# Patient Record
Sex: FEMALE | Race: BLACK OR AFRICAN AMERICAN | Employment: PART TIME | URBAN - METROPOLITAN AREA
[De-identification: names, ages, dates, MRNs, and addresses within clinical notes are randomized per-mention and may not be internally consistent; named-entity substitution may affect disease eponyms.]

---

## 2017-02-02 ENCOUNTER — HOSPITAL ENCOUNTER (OUTPATIENT)
Facility: HOSPITAL | Age: 47
Setting detail: OBSERVATION
Discharge: HOME | End: 2017-02-04
Attending: EMERGENCY MEDICINE | Admitting: INTERNAL MEDICINE
Payer: MEDICAID

## 2017-02-02 DIAGNOSIS — R11.2 SEVERE NAUSEA AND VOMITING: Primary | ICD-10-CM

## 2017-02-02 DIAGNOSIS — E87.1 HYPONATREMIA: ICD-10-CM

## 2017-02-02 LAB
ALBUMIN SERPL BCP-MCNC: 3.8 G/DL (ref 3.5–4.8)
ALBUMIN SERPL BCP-MCNC: 3.9 G/DL (ref 3.5–4.8)
ALBUMIN/GLOB SERPL: 1.1 {RATIO} (ref 1–2)
ALP SERPL-CCNC: 85 U/L (ref 32–100)
ALT SERPL-CCNC: 12 U/L (ref 14–54)
ANION GAP SERPL CALC-SCNC: 10 MMOL/L (ref 0–18)
ANION GAP SERPL CALC-SCNC: 10 MMOL/L (ref 0–18)
ANION GAP SERPL CALC-SCNC: 8 MMOL/L (ref 0–18)
ANION GAP SERPL CALC-SCNC: 8 MMOL/L (ref 0–18)
AST SERPL-CCNC: 20 U/L (ref 15–41)
BASOPHILS # BLD: 0.1 K/UL (ref 0–0.2)
BASOPHILS # BLD: 0.1 K/UL (ref 0–0.2)
BASOPHILS NFR BLD: 1 %
BASOPHILS NFR BLD: 1 %
BILIRUB SERPL-MCNC: 0.5 MG/DL (ref 0.3–1.2)
BILIRUB UR QL: NEGATIVE
BUN SERPL-MCNC: 3 MG/DL (ref 8–20)
BUN SERPL-MCNC: 4 MG/DL (ref 8–20)
BUN SERPL-MCNC: 4 MG/DL (ref 8–20)
BUN SERPL-MCNC: 5 MG/DL (ref 8–20)
BUN/CREAT SERPL: 3.8 (ref 10–20)
BUN/CREAT SERPL: 5.4 (ref 10–20)
BUN/CREAT SERPL: 5.5 (ref 10–20)
BUN/CREAT SERPL: 9.3 (ref 10–20)
CALCIUM SERPL-MCNC: 8.5 MG/DL (ref 8.5–10.5)
CALCIUM SERPL-MCNC: 8.9 MG/DL (ref 8.5–10.5)
CALCIUM SERPL-MCNC: 9 MG/DL (ref 8.5–10.5)
CALCIUM SERPL-MCNC: 9.4 MG/DL (ref 8.5–10.5)
CHLORIDE SERPL-SCNC: 81 MMOL/L (ref 95–110)
CHLORIDE SERPL-SCNC: 87 MMOL/L (ref 95–110)
CHLORIDE SERPL-SCNC: 89 MMOL/L (ref 95–110)
CHLORIDE SERPL-SCNC: 91 MMOL/L (ref 95–110)
CLARITY UR: CLEAR
CO2 SERPL-SCNC: 21 MMOL/L (ref 22–32)
CO2 SERPL-SCNC: 21 MMOL/L (ref 22–32)
CO2 SERPL-SCNC: 25 MMOL/L (ref 22–32)
CO2 SERPL-SCNC: 29 MMOL/L (ref 22–32)
COLOR UR: YELLOW
CREAT SERPL-MCNC: 0.54 MG/DL (ref 0.5–1.5)
CREAT SERPL-MCNC: 0.73 MG/DL (ref 0.5–1.5)
CREAT SERPL-MCNC: 0.74 MG/DL (ref 0.5–1.5)
CREAT SERPL-MCNC: 0.78 MG/DL (ref 0.5–1.5)
CREAT UR-MCNC: 51.5 MG/DL
EOSINOPHIL # BLD: 0 K/UL (ref 0–0.7)
EOSINOPHIL # BLD: 0.1 K/UL (ref 0–0.7)
EOSINOPHIL NFR BLD: 1 %
EOSINOPHIL NFR BLD: 1 %
ERYTHROCYTE [DISTWIDTH] IN BLOOD BY AUTOMATED COUNT: 13 % (ref 11–15)
ERYTHROCYTE [DISTWIDTH] IN BLOOD BY AUTOMATED COUNT: 13.1 % (ref 11–15)
GLOBULIN PLAS-MCNC: 3.5 G/DL (ref 2.5–3.7)
GLUCOSE SERPL-MCNC: 81 MG/DL (ref 70–99)
GLUCOSE SERPL-MCNC: 90 MG/DL (ref 70–99)
GLUCOSE SERPL-MCNC: 92 MG/DL (ref 70–99)
GLUCOSE SERPL-MCNC: 94 MG/DL (ref 70–99)
GLUCOSE UR-MCNC: NEGATIVE MG/DL
HCT VFR BLD AUTO: 32.4 % (ref 35–48)
HCT VFR BLD AUTO: 34.7 % (ref 35–48)
HGB BLD-MCNC: 11.3 G/DL (ref 12–16)
HGB BLD-MCNC: 12 G/DL (ref 12–16)
HGB UR QL STRIP.AUTO: NEGATIVE
KETONES UR-MCNC: NEGATIVE MG/DL
LEUKOCYTE ESTERASE UR QL STRIP.AUTO: NEGATIVE
LYMPHOCYTES # BLD: 2.2 K/UL (ref 1–4)
LYMPHOCYTES # BLD: 2.9 K/UL (ref 1–4)
LYMPHOCYTES NFR BLD: 51 %
LYMPHOCYTES NFR BLD: 52 %
MAGNESIUM SERPL-MCNC: 1.9 MG/DL (ref 1.8–2.5)
MCH RBC QN AUTO: 28.3 PG (ref 27–32)
MCH RBC QN AUTO: 28.5 PG (ref 27–32)
MCHC RBC AUTO-ENTMCNC: 34.7 G/DL (ref 32–37)
MCHC RBC AUTO-ENTMCNC: 34.9 G/DL (ref 32–37)
MCV RBC AUTO: 81.5 FL (ref 80–100)
MCV RBC AUTO: 81.5 FL (ref 80–100)
MONOCYTES # BLD: 0.3 K/UL (ref 0–1)
MONOCYTES # BLD: 0.5 K/UL (ref 0–1)
MONOCYTES NFR BLD: 7 %
MONOCYTES NFR BLD: 9 %
NEUTROPHILS # BLD AUTO: 1.7 K/UL (ref 1.8–7.7)
NEUTROPHILS # BLD AUTO: 2.2 K/UL (ref 1.8–7.7)
NEUTROPHILS NFR BLD: 38 %
NEUTROPHILS NFR BLD: 39 %
NITRITE UR QL STRIP.AUTO: NEGATIVE
OSMOLALITY UR CALC.SUM OF ELEC: 243 MOSM/KG (ref 275–295)
OSMOLALITY UR CALC.SUM OF ELEC: 246 MOSM/KG (ref 275–295)
OSMOLALITY UR CALC.SUM OF ELEC: 246 MOSM/KG (ref 275–295)
OSMOLALITY UR CALC.SUM OF ELEC: 250 MOSM/KG (ref 275–295)
OSMOLALITY UR: 272 MOSM/KG (ref 300–1100)
PH UR: 8 [PH] (ref 5–8)
PHOSPHATE SERPL-MCNC: 3.2 MG/DL (ref 2.4–4.7)
PLATELET # BLD AUTO: 319 K/UL (ref 140–400)
PLATELET # BLD AUTO: 340 K/UL (ref 140–400)
PMV BLD AUTO: 7.8 FL (ref 7.4–10.3)
PMV BLD AUTO: 7.9 FL (ref 7.4–10.3)
POTASSIUM SERPL-SCNC: 4.4 MMOL/L (ref 3.3–5.1)
POTASSIUM SERPL-SCNC: 4.7 MMOL/L (ref 3.3–5.1)
POTASSIUM SERPL-SCNC: 4.9 MMOL/L (ref 3.3–5.1)
POTASSIUM SERPL-SCNC: 4.9 MMOL/L (ref 3.3–5.1)
PROT SERPL-MCNC: 7.3 G/DL (ref 5.9–8.4)
PROT UR-MCNC: NEGATIVE MG/DL
RBC # BLD AUTO: 3.98 M/UL (ref 3.7–5.4)
RBC # BLD AUTO: 4.26 M/UL (ref 3.7–5.4)
RBC #/AREA URNS AUTO: <1 /HPF
SODIUM SERPL-SCNC: 118 MMOL/L (ref 136–144)
SODIUM SERPL-SCNC: 120 MMOL/L (ref 136–144)
SODIUM SERPL-SCNC: 120 MMOL/L (ref 136–144)
SODIUM SERPL-SCNC: 122 MMOL/L (ref 136–144)
SODIUM UR-SCNC: 84 MMOL/L
SP GR UR STRIP: 1.01 (ref 1–1.03)
TSH SERPL-ACNC: 0.46 UIU/ML (ref 0.34–5.6)
UROBILINOGEN UR STRIP-ACNC: <2
VIT C UR-MCNC: NEGATIVE MG/DL
WBC # BLD AUTO: 4.2 K/UL (ref 4–11)
WBC # BLD AUTO: 5.7 K/UL (ref 4–11)
WBC #/AREA URNS AUTO: 1 /HPF

## 2017-02-02 PROCEDURE — 99226 SUBSEQUENT OBSERVATION CARE: CPT | Performed by: INTERNAL MEDICINE

## 2017-02-02 RX ORDER — ONDANSETRON 2 MG/ML
4 INJECTION INTRAMUSCULAR; INTRAVENOUS ONCE
Status: COMPLETED | OUTPATIENT
Start: 2017-02-02 | End: 2017-02-02

## 2017-02-02 RX ORDER — SODIUM CHLORIDE 9 MG/ML
INJECTION, SOLUTION INTRAVENOUS CONTINUOUS
Status: ACTIVE | OUTPATIENT
Start: 2017-02-02 | End: 2017-02-02

## 2017-02-02 RX ORDER — SODIUM CHLORIDE 0.9 % (FLUSH) 0.9 %
SYRINGE (ML) INJECTION
Status: COMPLETED
Start: 2017-02-02 | End: 2017-02-02

## 2017-02-02 RX ORDER — OXCARBAZEPINE 300 MG/1
600 TABLET, FILM COATED ORAL 2 TIMES DAILY
Status: DISCONTINUED | OUTPATIENT
Start: 2017-02-02 | End: 2017-02-04

## 2017-02-02 RX ORDER — TOLVAPTAN 15 MG/1
7.5 TABLET ORAL DAILY
Status: DISCONTINUED | OUTPATIENT
Start: 2017-02-02 | End: 2017-02-02

## 2017-02-02 RX ORDER — SODIUM CHLORIDE 9 MG/ML
INJECTION, SOLUTION INTRAVENOUS CONTINUOUS
Status: DISCONTINUED | OUTPATIENT
Start: 2017-02-02 | End: 2017-02-04

## 2017-02-02 RX ORDER — LEVETIRACETAM 500 MG/1
500 TABLET ORAL 2 TIMES DAILY
Status: DISCONTINUED | OUTPATIENT
Start: 2017-02-02 | End: 2017-02-04

## 2017-02-02 RX ORDER — ALBUTEROL SULFATE 90 UG/1
1-2 AEROSOL, METERED RESPIRATORY (INHALATION) EVERY 6 HOURS PRN
Status: DISCONTINUED | OUTPATIENT
Start: 2017-02-02 | End: 2017-02-04

## 2017-02-02 RX ORDER — COSYNTROPIN 0.25 MG/ML
0.25 INJECTION, POWDER, FOR SOLUTION INTRAMUSCULAR; INTRAVENOUS ONCE
Status: COMPLETED | OUTPATIENT
Start: 2017-02-03 | End: 2017-02-03

## 2017-02-02 RX ORDER — LORAZEPAM 2 MG/ML
0.5 INJECTION INTRAMUSCULAR ONCE
Status: COMPLETED | OUTPATIENT
Start: 2017-02-02 | End: 2017-02-02

## 2017-02-02 RX ORDER — ONDANSETRON 2 MG/ML
4 INJECTION INTRAMUSCULAR; INTRAVENOUS EVERY 6 HOURS PRN
Status: DISCONTINUED | OUTPATIENT
Start: 2017-02-02 | End: 2017-02-04

## 2017-02-02 RX ORDER — LEVETIRACETAM 500 MG/1
500 TABLET ORAL 2 TIMES DAILY
COMMUNITY
End: 2021-01-21

## 2017-02-02 RX ORDER — SODIUM CHLORIDE 9 MG/ML
INJECTION, SOLUTION INTRAVENOUS
Status: DISPENSED
Start: 2017-02-02 | End: 2017-02-03

## 2017-02-02 RX ORDER — ALBUTEROL SULFATE 90 UG/1
1 AEROSOL, METERED RESPIRATORY (INHALATION) AS NEEDED
COMMUNITY
End: 2017-02-20

## 2017-02-02 RX ORDER — HYDROCORTISONE 10 MG/1
10 TABLET ORAL ONCE
Status: COMPLETED | OUTPATIENT
Start: 2017-02-02 | End: 2017-02-02

## 2017-02-02 RX ORDER — FOLIC ACID 1 MG/1
1 TABLET ORAL DAILY
COMMUNITY
End: 2021-01-21

## 2017-02-02 RX ORDER — LEVOTHYROXINE SODIUM 0.07 MG/1
100 TABLET ORAL
COMMUNITY
End: 2017-02-20

## 2017-02-02 RX ORDER — FOLIC ACID 1 MG/1
1 TABLET ORAL DAILY
Status: DISCONTINUED | OUTPATIENT
Start: 2017-02-02 | End: 2017-02-04

## 2017-02-02 RX ORDER — OXCARBAZEPINE 600 MG/1
600 TABLET, FILM COATED ORAL 2 TIMES DAILY
COMMUNITY
End: 2021-01-21

## 2017-02-02 NOTE — PLAN OF CARE
Patient/Family Goals    • Patient/Family Short Term Goal Progressing        No new complaints; ivf; up with assist;

## 2017-02-02 NOTE — ED NOTES
Spoke to arik to give reports and states she is unable to take report at this time since she is getting report on two other patients

## 2017-02-02 NOTE — ED NOTES
Assumed care of patient from [facility]. Patient is asleep at this time, breathing with ease, in no apparent distress. Patient given warm blanket for comfort. Will continue to monitor.

## 2017-02-02 NOTE — H&P
[facility]    History and Physical     De Santiago Patient Status:  Observation    1970 MRN [id number]   Location [facility]5W Attending Mason Velasquez MD   Hosp Day # 0 PCP None Pcp     Date:  2017  Date of Admission Negative. Cardiovascular: Negative. Gastrointestinal: Positive for vomiting and diarrhea. Endocrine: Negative. Genitourinary: Negative. Musculoskeletal: Negative. Hematological: Negative. Psychiatric/Behavioral: Negative.         Physica

## 2017-02-02 NOTE — ED PROVIDER NOTES
Patient Seen in: [facility] AND [facility] Emergency Department    History   Patient presents with:  Nausea/Vomiting/Diarrhea (gastrointestinal)    Stated Complaint: N/V     HPI    The patient is a 43-year-old female who presents to the emergency department with are normal. Mucous membranes are not dry. Oropharyngeal exudate (Post tonsillectomy exudate, no bleeding) present. Eyes: Conjunctivae and EOM are normal. Pupils are equal, round, and reactive to light. Neck: Normal range of motion. Neck supple.  No JVD DRAW GOLD   RAINBOW DRAW LAVENDER   RAINBOW DRAW LIGHT GREEN   RAINBOW DRAW DARK GREEN   RAINBOW DRAW LAVENDER TALL (BNP)       MDM     Pulse Ox: 100%, Normal, room air    Cardiac Monitor: Pulse Readings from Last 1 Encounters:  02/02/17 : 56  , sinus, nor

## 2017-02-03 LAB
ANION GAP SERPL CALC-SCNC: 3 MMOL/L (ref 0–18)
BUN SERPL-MCNC: 3 MG/DL (ref 8–20)
BUN/CREAT SERPL: 4.1 (ref 10–20)
CALCIUM SERPL-MCNC: 8.5 MG/DL (ref 8.5–10.5)
CHLORIDE SERPL-SCNC: 94 MMOL/L (ref 95–110)
CO2 SERPL-SCNC: 27 MMOL/L (ref 22–32)
CORTIS SERPL-MCNC: 12.9 MCG/DL
CORTIS SERPL-MCNC: 3.1 MCG/DL
CREAT SERPL-MCNC: 0.73 MG/DL (ref 0.5–1.5)
GLUCOSE SERPL-MCNC: 82 MG/DL (ref 70–99)
MRSA DNA SPEC QL NAA+PROBE: NEGATIVE
OSMOLALITY UR CALC.SUM OF ELEC: 254 MOSM/KG (ref 275–295)
POTASSIUM SERPL-SCNC: 4.4 MMOL/L (ref 3.3–5.1)
SODIUM SERPL-SCNC: 124 MMOL/L (ref 136–144)

## 2017-02-03 PROCEDURE — 99225 SUBSEQUENT OBSERVATION CARE: CPT | Performed by: INTERNAL MEDICINE

## 2017-02-03 RX ORDER — 0.9 % SODIUM CHLORIDE 0.9 %
VIAL (ML) INJECTION
Status: DISPENSED
Start: 2017-02-03 | End: 2017-02-03

## 2017-02-03 RX ORDER — HYDROCORTISONE 10 MG/1
10 TABLET ORAL DAILY
Status: DISCONTINUED | OUTPATIENT
Start: 2017-02-03 | End: 2017-02-03

## 2017-02-03 RX ORDER — HYDROCORTISONE 10 MG/1
20 TABLET ORAL DAILY
Status: DISCONTINUED | OUTPATIENT
Start: 2017-02-04 | End: 2017-02-04

## 2017-02-03 RX ORDER — FLUDROCORTISONE ACETATE 0.1 MG/1
0.1 TABLET ORAL DAILY
Status: DISCONTINUED | OUTPATIENT
Start: 2017-02-03 | End: 2017-02-03

## 2017-02-03 RX ORDER — HYDROCORTISONE 10 MG/1
10 TABLET ORAL NIGHTLY
Status: DISCONTINUED | OUTPATIENT
Start: 2017-02-03 | End: 2017-02-04

## 2017-02-03 NOTE — PROGRESS NOTES
[facility]    Progress Note    Inés De Santiago Patient Status:  Observation    1970 MRN [id number]   Location [facility] Attending Dione Rios MD   Hosp Day # 1 PCP None Pcp       Subjective:   Inés Arango is a(n) 40 y good strength  no deformities  Extremities: no edema, cyanosis  Neurological:  Grossly normal    Results:     Laboratory Data:    Lab Results  Component Value Date   WBC 4.2 02/02/2017   HGB 11.3* 02/02/2017   HCT 32.4* 02/02/2017    02/02/2017   CR

## 2017-02-03 NOTE — PROGRESS NOTES
[facility]    Progress Note     Anyi Patient Status:  Observation    1970 MRN [id number]   Location [facility] Attending Gregorio Sorto MD   Hosp Day # 1 PCP None Pcp     Subjective:     Constitutional: Thang Zarate

## 2017-02-03 NOTE — PAYOR COMM NOTE
Leydi Right #[id number]  (45 year old F)       [facility] [id number]         Yovany President, MD Physician Signed  H&P 2/2/2017  5:36 PM      Expand All Collapse All    [facility]    History and Physical      April AMAN De Santiago  Patient Status:  600 MG Oral Tab  Take 600 mg by mouth 2 (two) times daily.    Albuterol Sulfate  (90 Base) MCG/ACT Inhalation Aero Soln  Inhale 1 puff into the lungs as needed for Wheezing. Dose unknown by patient             Review of Systems:      Eyes: Negative.

## 2017-02-03 NOTE — CONSULTS
[facility]    PATIENT'S NAME: Shivani Cosme   ATTENDING PHYSICIAN: Asha Juares MD   CONSULTING PHYSICIAN: Anna Bee.  Crystal Oseguera MD   PATIENT ACCOUNT#:   [id number]    LOCATION:  [address] [place] [address] RECORD #:   [id number]       DATE OF BIRTH:  [birth year] equal and reactive. NECK:  Supple. No adenopathy or JVD. No bruits noted or thyromegaly. Sclerae anicteric. LUNGS:  Clear. HEART:  Regular rate and rhythm. No S3 or S4. No murmur. ABDOMEN:  Soft, nontender. Good bowel sounds. No bruits.   EXTREMI correct her and help her sodium levels. Her seizures are quiet right now. I discussed the case with Dr. Roge Bowen, and will follow up on Friday. Thank you very much for this consultation. Dictated By Tod Courtney.  Prabhu Mckeon MD  d: 02/03/2017 04:30:05  t: 86

## 2017-02-03 NOTE — PROGRESS NOTES
Nephrology consult dictated,, will follow w you. Bianca Murguia Spoke w dr Lc Lopez. . Thank you very much

## 2017-02-04 VITALS
SYSTOLIC BLOOD PRESSURE: 97 MMHG | HEART RATE: 72 BPM | RESPIRATION RATE: 18 BRPM | WEIGHT: 149.31 LBS | HEIGHT: 68 IN | DIASTOLIC BLOOD PRESSURE: 52 MMHG | BODY MASS INDEX: 22.63 KG/M2 | TEMPERATURE: 99 F | OXYGEN SATURATION: 100 %

## 2017-02-04 LAB
ANION GAP SERPL CALC-SCNC: 6 MMOL/L (ref 0–18)
BUN SERPL-MCNC: 4 MG/DL (ref 8–20)
BUN/CREAT SERPL: 5.9 (ref 10–20)
CALCIUM SERPL-MCNC: 8.6 MG/DL (ref 8.5–10.5)
CHLORIDE SERPL-SCNC: 104 MMOL/L (ref 95–110)
CO2 SERPL-SCNC: 26 MMOL/L (ref 22–32)
CREAT SERPL-MCNC: 0.68 MG/DL (ref 0.5–1.5)
GLUCOSE SERPL-MCNC: 90 MG/DL (ref 70–99)
OSMOLALITY UR CALC.SUM OF ELEC: 278 MOSM/KG (ref 275–295)
POTASSIUM SERPL-SCNC: 4.1 MMOL/L (ref 3.3–5.1)
SODIUM SERPL-SCNC: 136 MMOL/L (ref 136–144)
T4 SERPL-MCNC: 5.6 MCG/DL (ref 6.1–12.2)

## 2017-02-04 RX ORDER — HYDROCORTISONE 10 MG/1
10 TABLET ORAL NIGHTLY
Qty: 30 TABLET | Refills: 0 | Status: SHIPPED | OUTPATIENT
Start: 2017-02-04 | End: 2017-02-20

## 2017-02-04 RX ORDER — DEXTROSE MONOHYDRATE 50 MG/ML
INJECTION, SOLUTION INTRAVENOUS CONTINUOUS
Status: DISPENSED | OUTPATIENT
Start: 2017-02-04 | End: 2017-02-04

## 2017-02-04 RX ORDER — HYDROCORTISONE 20 MG/1
20 TABLET ORAL DAILY
Qty: 30 TABLET | Refills: 0 | Status: SHIPPED | OUTPATIENT
Start: 2017-02-04 | End: 2017-02-20

## 2017-02-04 NOTE — PLAN OF CARE
Patient/Family Goals    • Patient/Family Long Term Goal Progressing    • Patient/Family Short Term Goal Progressing        Tolerating po intake, denies N/V/D, po solu-cortef as ordered by MD, seizure precautions maintained, denies pain, steady when up with

## 2017-02-04 NOTE — DISCHARGE SUMMARY
[facility]    Discharge Summary     Anyi Patient Status:  Observation    1970 MRN [id number]   Location [facility]5W Attending Rohit Hills MD   Hosp Day # 2 PCP None Pcp     Date of Admission: 2017 Aron this was given:  20 mg on 2/4/2017  9:35 AM   Commonly known as:  CORTEF        Take 1 tablet (20 mg total) by mouth daily.     Quantity:  30 tablet   Refills:  0         CONTINUE taking these medications       Instructions Prescription details    Albuterol

## 2017-02-04 NOTE — PLAN OF CARE
Patient/Family Goals    • Patient/Family Long Term Goal Progressing    • Patient/Family Short Term Goal Progressing        Na level 124,continuing ivf,fluid restriction,denies any nausea/vomiting

## 2017-02-08 ENCOUNTER — TELEPHONE (OUTPATIENT)
Dept: NEPHROLOGY | Facility: CLINIC | Age: 47
End: 2017-02-08

## 2017-02-20 ENCOUNTER — APPOINTMENT (OUTPATIENT)
Dept: LAB | Facility: HOSPITAL | Age: 47
End: 2017-02-20
Attending: INTERNAL MEDICINE

## 2017-02-20 ENCOUNTER — OFFICE VISIT (OUTPATIENT)
Dept: NEPHROLOGY | Facility: CLINIC | Age: 47
End: 2017-02-20

## 2017-02-20 VITALS
HEIGHT: 68.5 IN | DIASTOLIC BLOOD PRESSURE: 75 MMHG | SYSTOLIC BLOOD PRESSURE: 125 MMHG | BODY MASS INDEX: 22.2 KG/M2 | HEART RATE: 66 BPM | WEIGHT: 148.19 LBS

## 2017-02-20 DIAGNOSIS — E27.40 ADRENAL CORTICAL INSUFFICIENCY (HCC): ICD-10-CM

## 2017-02-20 DIAGNOSIS — E87.1 HYPONATREMIA: Primary | ICD-10-CM

## 2017-02-20 LAB
ANION GAP SERPL CALC-SCNC: 8 MMOL/L (ref 0–18)
BUN SERPL-MCNC: 10 MG/DL (ref 8–20)
BUN/CREAT SERPL: 12.3 (ref 10–20)
CALCIUM SERPL-MCNC: 8.8 MG/DL (ref 8.5–10.5)
CHLORIDE SERPL-SCNC: 102 MMOL/L (ref 95–110)
CO2 SERPL-SCNC: 30 MMOL/L (ref 22–32)
CREAT SERPL-MCNC: 0.81 MG/DL (ref 0.5–1.5)
GLUCOSE SERPL-MCNC: 82 MG/DL (ref 70–99)
OSMOLALITY UR CALC.SUM OF ELEC: 288 MOSM/KG (ref 275–295)
POTASSIUM SERPL-SCNC: 3.1 MMOL/L (ref 3.3–5.1)
SODIUM SERPL-SCNC: 140 MMOL/L (ref 136–144)

## 2017-02-20 PROCEDURE — 99214 OFFICE O/P EST MOD 30 MIN: CPT | Performed by: INTERNAL MEDICINE

## 2017-02-20 PROCEDURE — 99212 OFFICE O/P EST SF 10 MIN: CPT | Performed by: INTERNAL MEDICINE

## 2017-02-20 PROCEDURE — 80048 BASIC METABOLIC PNL TOTAL CA: CPT | Performed by: INTERNAL MEDICINE

## 2017-02-20 RX ORDER — HYDROCODONE BITARTRATE AND ACETAMINOPHEN 5; 325 MG/1; MG/1
TABLET ORAL AS NEEDED
Refills: 0 | COMMUNITY
Start: 2017-01-23 | End: 2021-01-21

## 2017-02-20 RX ORDER — HYDROCORTISONE 20 MG/1
20 TABLET ORAL DAILY
Qty: 30 TABLET | Refills: 5 | Status: SHIPPED | OUTPATIENT
Start: 2017-02-20 | End: 2021-01-21

## 2017-02-20 RX ORDER — HYDROCORTISONE 10 MG/1
10 TABLET ORAL NIGHTLY
Qty: 30 TABLET | Refills: 3 | Status: SHIPPED | OUTPATIENT
Start: 2017-02-20 | End: 2017-06-16

## 2017-02-20 RX ORDER — LEVOTHYROXINE SODIUM 0.1 MG/1
100 TABLET ORAL
Qty: 90 TABLET | Refills: 1 | Status: SHIPPED | OUTPATIENT
Start: 2017-02-20 | End: 2021-01-21

## 2017-02-20 RX ORDER — ALBUTEROL SULFATE 90 UG/1
2 AEROSOL, METERED RESPIRATORY (INHALATION) EVERY 6 HOURS PRN
Qty: 1 INHALER | Refills: 3 | Status: SHIPPED | OUTPATIENT
Start: 2017-02-20

## 2017-02-20 NOTE — PATIENT INSTRUCTIONS
1..  INCREASE THYROID MED TO 100MCG DAY    2..   CORTISONE  20MG  AM    10MG  PM    3.  DO BLOOD TEST FOR SODIUM AND CALL ME FOR RESULTS    4. USE CREAM TO DRY SKIN  1-2 X A DAY    5.  FOLLOW UP W NEUROLOGIST    6..   I WILL SEND LETTER TO YOUR PCP    7.

## 2017-02-22 ENCOUNTER — TELEPHONE (OUTPATIENT)
Dept: NEPHROLOGY | Facility: CLINIC | Age: 47
End: 2017-02-22

## 2017-02-23 NOTE — TELEPHONE ENCOUNTER
Spoke w pt. . Will have 12 oz oj a day for k, ,doesnt' want supplemetns.  Results sent to pcp   Na issue ersolved w ej

## 2017-03-08 NOTE — PROGRESS NOTES
NEPHROLOGY PROGRESS NOTE  Papo Engel     MRN:  [id number]   Date of Service:  2/20/17     I saw April today for a followup for her kidneys. She had hyponatremia in [facility] last month with sodium levels down to 120.  She had nausea and vomiting duri

## 2017-06-16 RX ORDER — HYDROCORTISONE 10 MG/1
TABLET ORAL
Qty: 30 TABLET | Refills: 5 | Status: SHIPPED | OUTPATIENT
Start: 2017-06-16 | End: 2021-01-21

## 2017-09-28 RX ORDER — HYDROCORTISONE 20 MG/1
TABLET ORAL
Qty: 90 TABLET | Refills: 3 | OUTPATIENT
Start: 2017-09-28

## 2018-03-05 ENCOUNTER — HOSPITAL ENCOUNTER (EMERGENCY)
Facility: HOSPITAL | Age: 48
Discharge: HOME | End: 2018-03-05
Attending: EMERGENCY MEDICINE
Payer: COMMERCIAL

## 2018-03-05 ENCOUNTER — APPOINTMENT (OUTPATIENT)
Dept: GENERAL RADIOLOGY | Facility: HOSPITAL | Age: 48
End: 2018-03-05
Attending: EMERGENCY MEDICINE
Payer: COMMERCIAL

## 2018-03-05 VITALS
BODY MASS INDEX: 22 KG/M2 | TEMPERATURE: 98 F | HEART RATE: 64 BPM | OXYGEN SATURATION: 100 % | RESPIRATION RATE: 16 BRPM | DIASTOLIC BLOOD PRESSURE: 63 MMHG | SYSTOLIC BLOOD PRESSURE: 104 MMHG | WEIGHT: 148.13 LBS

## 2018-03-05 DIAGNOSIS — S16.1XXA STRAIN OF NECK MUSCLE, INITIAL ENCOUNTER: Primary | ICD-10-CM

## 2018-03-05 DIAGNOSIS — S40.011A CONTUSION OF RIGHT SHOULDER, INITIAL ENCOUNTER: ICD-10-CM
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
## 2018-03-05 PROCEDURE — 99284 EMERGENCY DEPT VISIT MOD MDM: CPT

## 2018-03-05 PROCEDURE — 73030 X-RAY EXAM OF SHOULDER: CPT | Performed by: EMERGENCY MEDICINE

## 2018-03-05 PROCEDURE — 96372 THER/PROPH/DIAG INJ SC/IM: CPT

## 2018-03-05 PROCEDURE — 72040 X-RAY EXAM NECK SPINE 2-3 VW: CPT | Performed by: EMERGENCY MEDICINE

## 2018-03-05 RX ORDER — CYCLOBENZAPRINE HCL 5 MG
5 TABLET ORAL 3 TIMES DAILY PRN
Qty: 15 TABLET | Refills: 0 | Status: SHIPPED | OUTPATIENT
Start: 2018-03-05 | End: 2021-01-21

## 2018-03-05 RX ORDER — KETOROLAC TROMETHAMINE 30 MG/ML
60 INJECTION, SOLUTION INTRAMUSCULAR; INTRAVENOUS ONCE
Status: COMPLETED | OUTPATIENT
Start: 2018-03-05 | End: 2018-03-05
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
## 2021-01-21 ENCOUNTER — LAB ENCOUNTER (OUTPATIENT)
Dept: LAB | Age: 51
End: 2021-01-21
Attending: FAMILY MEDICINE
Payer: COMMERCIAL

## 2021-01-21 ENCOUNTER — OFFICE VISIT (OUTPATIENT)
Dept: FAMILY MEDICINE CLINIC | Facility: CLINIC | Age: 51
End: 2021-01-21
Payer: COMMERCIAL

## 2021-01-21 VITALS
HEART RATE: 78 BPM | DIASTOLIC BLOOD PRESSURE: 75 MMHG | HEIGHT: 67.5 IN | SYSTOLIC BLOOD PRESSURE: 109 MMHG | WEIGHT: 146 LBS | BODY MASS INDEX: 22.65 KG/M2

## 2021-01-21 DIAGNOSIS — Z00.00 ANNUAL PHYSICAL EXAM: ICD-10-CM

## 2021-01-21 DIAGNOSIS — E23.0 PITUITARY INSUFFICIENCY (HCC): ICD-10-CM

## 2021-01-21 DIAGNOSIS — J45.909 UNCOMPLICATED ASTHMA, UNSPECIFIED ASTHMA SEVERITY, UNSPECIFIED WHETHER PERSISTENT: ICD-10-CM

## 2021-01-21 DIAGNOSIS — G40.909 SEIZURE DISORDER (HCC): ICD-10-CM

## 2021-01-21 DIAGNOSIS — Z01.419 WOMEN'S ANNUAL ROUTINE GYNECOLOGICAL EXAMINATION: Primary | ICD-10-CM

## 2021-01-21 PROBLEM — E03.9 HYPOTHYROIDISM: Status: ACTIVE | Noted: 2018-01-22

## 2021-01-21 PROBLEM — Z86.018 HISTORY OF PITUITARY ADENOMA: Status: ACTIVE | Noted: 2018-01-22

## 2021-01-21 PROBLEM — E27.40 ADRENAL INSUFFICIENCY (HCC): Status: ACTIVE | Noted: 2018-01-22

## 2021-01-21 LAB
ALBUMIN SERPL-MCNC: 3.8 G/DL (ref 3.4–5)
ALBUMIN/GLOB SERPL: 0.8 {RATIO} (ref 1–2)
ALP LIVER SERPL-CCNC: 77 U/L
ALT SERPL-CCNC: 23 U/L
ANION GAP SERPL CALC-SCNC: 4 MMOL/L (ref 0–18)
AST SERPL-CCNC: 34 U/L (ref 15–37)
BASOPHILS # BLD AUTO: 0.03 X10(3) UL (ref 0–0.2)
BASOPHILS NFR BLD AUTO: 0.5 %
BILIRUB SERPL-MCNC: 0.3 MG/DL (ref 0.1–2)
BUN BLD-MCNC: 13 MG/DL (ref 7–18)
BUN/CREAT SERPL: 11.8 (ref 10–20)
CALCIUM BLD-MCNC: 8.7 MG/DL (ref 8.5–10.1)
CHLORIDE SERPL-SCNC: 106 MMOL/L (ref 98–112)
CHOLEST SMN-MCNC: 259 MG/DL (ref ?–200)
CO2 SERPL-SCNC: 28 MMOL/L (ref 21–32)
CREAT BLD-MCNC: 1.1 MG/DL
DEPRECATED RDW RBC AUTO: 46.2 FL (ref 35.1–46.3)
EOSINOPHIL # BLD AUTO: 0.13 X10(3) UL (ref 0–0.7)
EOSINOPHIL NFR BLD AUTO: 2.4 %
ERYTHROCYTE [DISTWIDTH] IN BLOOD BY AUTOMATED COUNT: 14.6 % (ref 11–15)
GLOBULIN PLAS-MCNC: 4.6 G/DL (ref 2.8–4.4)
GLUCOSE BLD-MCNC: 63 MG/DL (ref 70–99)
HCT VFR BLD AUTO: 32.4 %
HDLC SERPL-MCNC: 46 MG/DL (ref 40–59)
HGB BLD-MCNC: 10.5 G/DL
IMM GRANULOCYTES # BLD AUTO: 0.01 X10(3) UL (ref 0–1)
IMM GRANULOCYTES NFR BLD: 0.2 %
LDLC SERPL CALC-MCNC: 158 MG/DL (ref ?–100)
LYMPHOCYTES # BLD AUTO: 3.04 X10(3) UL (ref 1–4)
LYMPHOCYTES NFR BLD AUTO: 55.3 %
M PROTEIN MFR SERPL ELPH: 8.4 G/DL (ref 6.4–8.2)
MCH RBC QN AUTO: 27.9 PG (ref 26–34)
MCHC RBC AUTO-ENTMCNC: 32.4 G/DL (ref 31–37)
MCV RBC AUTO: 86.2 FL
MONOCYTES # BLD AUTO: 0.27 X10(3) UL (ref 0.1–1)
MONOCYTES NFR BLD AUTO: 4.9 %
NEUTROPHILS # BLD AUTO: 2.02 X10 (3) UL (ref 1.5–7.7)
NEUTROPHILS # BLD AUTO: 2.02 X10(3) UL (ref 1.5–7.7)
NEUTROPHILS NFR BLD AUTO: 36.7 %
NONHDLC SERPL-MCNC: 213 MG/DL (ref ?–130)
OSMOLALITY SERPL CALC.SUM OF ELEC: 284 MOSM/KG (ref 275–295)
PATIENT FASTING Y/N/NP: NO
PATIENT FASTING Y/N/NP: NO
PLATELET # BLD AUTO: 213 10(3)UL (ref 150–450)
POTASSIUM SERPL-SCNC: 4 MMOL/L (ref 3.5–5.1)
RBC # BLD AUTO: 3.76 X10(6)UL
SODIUM SERPL-SCNC: 138 MMOL/L (ref 136–145)
TRIGL SERPL-MCNC: 274 MG/DL (ref 30–149)
TSI SER-ACNC: 3.56 MIU/ML (ref 0.36–3.74)
VLDLC SERPL CALC-MCNC: 55 MG/DL (ref 0–30)
WBC # BLD AUTO: 5.5 X10(3) UL (ref 4–11)

## 2021-01-21 PROCEDURE — 3074F SYST BP LT 130 MM HG: CPT | Performed by: FAMILY MEDICINE

## 2021-01-21 PROCEDURE — 3078F DIAST BP <80 MM HG: CPT | Performed by: FAMILY MEDICINE

## 2021-01-21 PROCEDURE — 99396 PREV VISIT EST AGE 40-64: CPT | Performed by: FAMILY MEDICINE

## 2021-01-21 PROCEDURE — 36415 COLL VENOUS BLD VENIPUNCTURE: CPT

## 2021-01-21 PROCEDURE — 80061 LIPID PANEL: CPT

## 2021-01-21 PROCEDURE — 3008F BODY MASS INDEX DOCD: CPT | Performed by: FAMILY MEDICINE

## 2021-01-21 PROCEDURE — 85025 COMPLETE CBC W/AUTO DIFF WBC: CPT

## 2021-01-21 PROCEDURE — 84443 ASSAY THYROID STIM HORMONE: CPT

## 2021-01-21 PROCEDURE — 80053 COMPREHEN METABOLIC PANEL: CPT

## 2021-01-21 RX ORDER — LEVETIRACETAM 500 MG/1
500 TABLET ORAL DAILY
Qty: 90 TABLET | Refills: 1 | Status: SHIPPED | OUTPATIENT
Start: 2021-01-21

## 2021-01-21 RX ORDER — LEVOTHYROXINE SODIUM 0.1 MG/1
100 TABLET ORAL
Qty: 90 TABLET | Refills: 1 | Status: CANCELLED | OUTPATIENT
Start: 2021-01-21

## 2021-01-21 RX ORDER — OXCARBAZEPINE 600 MG/1
600 TABLET, FILM COATED ORAL 2 TIMES DAILY
Refills: 0 | Status: CANCELLED | OUTPATIENT
Start: 2021-01-21

## 2021-01-21 RX ORDER — OXCARBAZEPINE 300 MG/1
300 TABLET, FILM COATED ORAL NIGHTLY
Qty: 90 TABLET | Refills: 0 | Status: SHIPPED | OUTPATIENT
Start: 2021-01-21 | End: 2021-05-25

## 2021-01-21 RX ORDER — OXCARBAZEPINE 300 MG/1
300 TABLET, FILM COATED ORAL NIGHTLY
COMMUNITY
Start: 2020-11-20 | End: 2021-01-21

## 2021-01-21 RX ORDER — LEVOTHYROXINE SODIUM 0.1 MG/1
100 TABLET ORAL
Qty: 90 TABLET | Refills: 1 | Status: SHIPPED | OUTPATIENT
Start: 2021-01-21 | End: 2021-07-19

## 2021-01-21 RX ORDER — OXCARBAZEPINE 300 MG/1
300 TABLET, FILM COATED ORAL NIGHTLY
Refills: 0 | Status: CANCELLED | OUTPATIENT
Start: 2021-01-21

## 2021-01-21 RX ORDER — LEVETIRACETAM 500 MG/1
500 TABLET ORAL 2 TIMES DAILY
Refills: 0 | Status: CANCELLED | OUTPATIENT
Start: 2021-01-21

## 2021-01-21 RX ORDER — OXCARBAZEPINE 600 MG/1
600 TABLET, FILM COATED ORAL DAILY
Qty: 90 TABLET | Refills: 1 | Status: SHIPPED | OUTPATIENT
Start: 2021-01-21 | End: 2021-08-18

## 2021-01-21 RX ORDER — OXCARBAZEPINE 600 MG/1
TABLET, FILM COATED ORAL
COMMUNITY
Start: 2018-09-21 | End: 2021-01-21

## 2021-01-21 NOTE — PROGRESS NOTES
HPI:    Patient ID: April C Henrietta Herman is a 53year old female. Patient here for physical.  PMHx reviewed/   Did not have physicians for year no insurance  Seizures controlled/ did not see       Review of Systems  Constitutional: Negative.   Negative for activ swelling, tenderness or discharge. Abdominal: Soft. Bowel sounds are normal. She exhibits no distension and no mass. There is no abdominal tenderness. There is no rebound and no guarding.  Hernia confirmed negative in the right inguinal area and confirmed Sig: Take 1 tablet (600 mg total) by mouth daily.    • Levothyroxine Sodium 100 MCG Oral Tab 90 tablet 1     Sig: Take 1 tablet (100 mcg total) by mouth before breakfast.       Imaging & Referrals:  ENDOCRINOLOGY - INTERNAL  PULMONARY - INTERNAL  NEURO - IN

## 2021-01-26 ENCOUNTER — VIRTUAL PHONE E/M (OUTPATIENT)
Dept: FAMILY MEDICINE CLINIC | Facility: CLINIC | Age: 51
End: 2021-01-26
Payer: COMMERCIAL

## 2021-01-26 DIAGNOSIS — D64.9 ANEMIA, UNSPECIFIED TYPE: Primary | ICD-10-CM

## 2021-01-26 PROCEDURE — 99213 OFFICE O/P EST LOW 20 MIN: CPT | Performed by: FAMILY MEDICINE

## 2021-01-26 RX ORDER — FERROUS SULFATE 325(65) MG
325 TABLET ORAL 2 TIMES DAILY
Qty: 60 TABLET | Refills: 2 | Status: SHIPPED | OUTPATIENT
Start: 2021-01-26 | End: 2021-04-27

## 2021-01-27 LAB
HPV I/H RISK 1 DNA SPEC QL NAA+PROBE: POSITIVE
LAST PAP RESULT: NORMAL
PAP HISTORY (OTHER THAN LAST PAP): NORMAL

## 2021-01-28 LAB
HPV16 DNA CVX QL PROBE+SIG AMP: NEGATIVE
HPV18 DNA CVX QL PROBE+SIG AMP: NEGATIVE

## 2021-02-08 NOTE — PROGRESS NOTES
HPI:    Patient ID: Inés Stephenson is a 53year old female.     Telehealth outside of [address] Verbal Consent   I conducted a telehealth visit with Inés Navarro today, 02/07/21, which was completed using two-way, real-time interactive audio and vid Systems  Constitutional: Negative. Negative for activity change, appetite change, diaphoresis and fatigue. Respiratory: Negative. Negative for apnea, cough, chest tightness and shortness of breath. Cardiovascular: Negative.   Negative for chest gretchen total) by mouth 2 (two) times a day.        Imaging & Referrals:  None       EI#[id number]

## 2021-02-23 NOTE — TELEPHONE ENCOUNTER
LOV 2/20/17
Labs including blood cultures drawn, UA sent, 2L NSS given and NSS started at 75ml/hr. Antibiotics started. See RRT note.

## 2021-04-27 RX ORDER — LIDOCAINE HYDROCHLORIDE 20 MG/ML
SOLUTION ORAL; TOPICAL
Qty: 60 TABLET | Refills: 0 | Status: SHIPPED | OUTPATIENT
Start: 2021-04-27

## 2021-05-25 RX ORDER — OXCARBAZEPINE 300 MG/1
300 TABLET, FILM COATED ORAL NIGHTLY
Qty: 90 TABLET | Refills: 0 | Status: SHIPPED | OUTPATIENT
Start: 2021-05-25 | End: 2021-07-19

## 2021-07-19 RX ORDER — OXCARBAZEPINE 300 MG/1
300 TABLET, FILM COATED ORAL NIGHTLY
Qty: 90 TABLET | Refills: 0 | Status: SHIPPED | OUTPATIENT
Start: 2021-07-19

## 2021-07-19 RX ORDER — LEVOTHYROXINE SODIUM 0.1 MG/1
100 TABLET ORAL
Qty: 90 TABLET | Refills: 0 | Status: SHIPPED | OUTPATIENT
Start: 2021-07-19 | End: 2021-10-17

## 2021-08-10 ENCOUNTER — TELEPHONE (OUTPATIENT)
Dept: FAMILY MEDICINE CLINIC | Facility: CLINIC | Age: 51
End: 2021-08-10

## 2021-08-10 NOTE — TELEPHONE ENCOUNTER
•  OXCARBAZEPINE 300 MG Oral Tab, Take 1 tablet (300 mg total) by mouth nightly.  TAKE AT BEDTIME, Disp: 90 tablet, Rfl: 0

## 2021-08-10 NOTE — TELEPHONE ENCOUNTER
[phone] [facility], [phone]   Outpatient Medication Detail     Disp Refills Start End    OXCARBAZEPINE 300 MG Oral Tab 90 tablet 0 7/19/2021     Sig - Route:  Take 1 tablet (300 mg total) by mouth sandy

## 2021-08-18 RX ORDER — OXCARBAZEPINE 600 MG/1
600 TABLET, FILM COATED ORAL DAILY
Qty: 90 TABLET | Refills: 1 | Status: SHIPPED | OUTPATIENT
Start: 2021-08-18

## 2021-08-18 NOTE — TELEPHONE ENCOUNTER
Please Review.  Protocol failed / No protocol     Requested Prescriptions   Pending Prescriptions Disp Refills    OXCARBAZEPINE 600 MG Oral Tab [Pharmacy Med Name: Oxcarbazepine 600 Mg Tab Stephon] 90 tablet 0     Sig: Take 1 tablet (600 mg total) by mouth roberto

## 2021-10-17 RX ORDER — LEVOTHYROXINE SODIUM 0.1 MG/1
100 TABLET ORAL
Qty: 90 TABLET | Refills: 1 | Status: SHIPPED | OUTPATIENT
Start: 2021-10-17

## 2021-10-17 NOTE — TELEPHONE ENCOUNTER
Refill passed per [facility] protocol   Requested Prescriptions   Pending Prescriptions Disp Refills    LEVOTHYROXINE 100 MCG Oral Tab [Pharmacy Med Name: Levothyroxine Sodium 100 Mcg Tab Lupi] 90 tablet 0     Sig: Take 1 tablet (100 mcg total) by monty

## 2021-10-29 NOTE — TELEPHONE ENCOUNTER
Please review. Protocol failed / No protocol.   Requested Prescriptions   Pending Prescriptions Disp Refills    OXCARBAZEPINE 300 MG Oral Tab [Pharmacy Med Name: Oxcarbazepine 300 Mg Tab Stephon] 90 tablet 0     Sig: Take 1 tablet (300 mg total) by mouth sandy

## 2021-10-30 RX ORDER — OXCARBAZEPINE 600 MG/1
600 TABLET, FILM COATED ORAL DAILY
Qty: 90 TABLET | Refills: 0 | OUTPATIENT
Start: 2021-10-30

## 2021-10-30 RX ORDER — OXCARBAZEPINE 300 MG/1
300 TABLET, FILM COATED ORAL NIGHTLY
Qty: 90 TABLET | Refills: 0 | OUTPATIENT
Start: 2021-10-30

## 2021-10-30 NOTE — TELEPHONE ENCOUNTER
[employer] message sent. Left complete message to patient's voice mail=second attempt. Pended medications declined-see Dr Alvarez Friends note. Yrn Castle

## 2021-11-01 NOTE — TELEPHONE ENCOUNTER
No response letter sent in the mail. The patient is not calling us back or viewing her [employer] message.     Refusal reason: Refill not appropriate (per PCP, refil; should be from neurology ,)

## 2022-04-14 NOTE — TELEPHONE ENCOUNTER
Protocol failed or has No Protocol, please review    Last office visit = 1/26/2021   Last refill = 8/2021    Requested Prescriptions   Pending Prescriptions Disp Refills    OXCARBAZEPINE 600 MG Oral Tab [Pharmacy Med Name: Oxcarbazepine 600 Mg Tab Stephon] 90 tablet 0     Sig: Take 1 tablet (600 mg total) by mouth daily.         Neurology Medications Failed - 4/14/2022  1:30 AM        Failed - Appointment in the past 6 or next 3 months              Recent Outpatient Visits              1 year ago Anemia, unspecified type    Alondra Christian MD    [employer] E/M    1 year ago [employer] annual routine gynecological examination    Alondra Christian MD    Office Visit    5 years ago Hyponatremia    [facility], [address] [place] [address], [facility]    Office Visit

## 2022-04-15 RX ORDER — OXCARBAZEPINE 600 MG/1
600 TABLET, FILM COATED ORAL DAILY
Qty: 90 TABLET | Refills: 1 | Status: SHIPPED | OUTPATIENT
Start: 2022-04-15

## 2022-05-10 RX ORDER — OXCARBAZEPINE 600 MG/1
600 TABLET, FILM COATED ORAL DAILY
Qty: 90 TABLET | Refills: 0 | OUTPATIENT
Start: 2022-05-10

## 2022-06-16 ENCOUNTER — TELEPHONE (OUTPATIENT)
Dept: ENDOCRINOLOGY CLINIC | Facility: CLINIC | Age: 52
End: 2022-06-16

## 2022-06-16 NOTE — TELEPHONE ENCOUNTER
Received a fax from [facility] from Alphonse Pfeiffer MD of pt's [facility] dated on 6/14/22. Pt currently does not have a scheduled appointment with Dr. Celia Villalta called to help schedule a consult appointment for Acquired Hypothyroidism, Vit D deficiency, Vitamin B12 deficiency (non anemic). Called to help schedule a consult appointment, no answer, mailbox was full, unable to leave a message will try again later.

## 2022-06-20 NOTE — TELEPHONE ENCOUNTER
[facility] HOSPITALIST     Christa Padron PA    CHIEF COMPLAINT: Headache    Patient admitted for planned elective craniotomy due to her previously diagnosed meningioma.  Patient has been monitored in the ICU perioperatively has been stable and is currently recommended for transfer to a PCU bed.  Patient seen at bedside he is doing well has no complaints of of now.  No significant pain has been having bowel movements urinating without a problem.  Has no chest pain no nausea no vomiting no focal weakness no focal numbness.    Review of systems  Neuro no focal is no focal numbness no headache  GI no nausea no vomiting  Cardiac no chest pain no palpitations    Past Medical History:   Diagnosis Date   • Allergic    • Anxiety    • Asthma    • Cancer (HCC)     ALL as child    • Class 3 severe obesity with body mass index (BMI) of 40.0 to 44.9 in adult 11/2/2021   • Depression    • GERD (gastroesophageal reflux disease)    • Hypertension    • Leukemia in remission, childhood; S/P Radiation      Past Surgical History:   Procedure Laterality Date   • CRANIOTOMY FOR TUMOR Left 11/2/2021    Procedure: CRANIOTOMY FOR RESECTION OF LEFT PARIETAL TUMOR;  Surgeon: Francois Jean MD;  Location: [facility] OR;  Service: Neurosurgery;  Laterality: Left;   • HERNIA REPAIR      7 MONTHS OLD   • PORTACATH PLACEMENT      CHILDHOOD     Family History   Problem Relation Age of Onset   • Cancer Maternal Grandfather      Social History     Tobacco Use   • Smoking status: Never Smoker   • Smokeless tobacco: Never Used   Vaping Use   • Vaping Use: Never used   Substance Use Topics   • Alcohol use: Yes     Comment: Weekends   • Drug use: Never     Medications Prior to Admission   Medication Sig Dispense Refill Last Dose   • amLODIPine (NORVASC) 10 MG tablet Take 1 tablet by mouth Daily. (Patient taking differently: Take 5 mg by mouth Daily.) 30 tablet 0 11/2/2021 at Unknown time   • dexamethasone (DECADRON) 4 MG tablet  Called to help schedule a consult appointment, no answer, mailbox was full, unable to leave a message.  x2 "Take 4 mg by mouth 4 (Four) Times a Day.   11/2/2021 at Unknown time   • famotidine (PEPCID) 20 MG tablet Take 20 mg by mouth 2 (Two) Times a Day. Take DOS   11/2/2021 at Unknown time   • levETIRAcetam (KEPPRA) 500 MG tablet Take 500 mg by mouth 2 (two) times a day. Take DOS   11/2/2021 at Unknown time   • sucralfate (Carafate) 1 g tablet Take 1 tablet by mouth 4 (Four) Times a Day. 28 tablet 0 11/1/2021 at Unknown time     Allergies:  Patient has no known allergies.      There is no immunization history on file for this patient.        Vital Signs  Visit Vitals  BP (!) 159/101   Pulse 110   Temp 98 °F (36.7 °C) (Oral)   Resp 17   Ht 167.6 cm (65.98\")   Wt 130 kg (285 lb 15 oz)   SpO2 95%   BMI 46.17 kg/m²       Physical Exam  Physical Exam   Constitutional:  oriented to person, place, and time. No distress.   HENT:   Head: Craniotomy site within normal limits  Eyes: Conjunctivae and EOM are normal. Pupils are equal, round, and reactive to light.   Neck: No JVD present. No thyromegaly present.   Cardiovascular: Normal rate, regular rhythm, normal heart sounds and intact distal pulses. Exam reveals no gallop and no friction rub.   No murmur heard.  Pulmonary/Chest: Effort normal and breath sounds normal. No stridor. No respiratory distress.  has no wheezes.  has no rales.  exhibits no tenderness.   Abdominal: Soft. Bowel sounds are normal.  no distension and no mass. There is no tenderness. There is no rebound and no guarding. No hernia.   Musculoskeletal: Normal range of motion.   Lymphadenopathy:     no cervical adenopathy.   Neurological:  alert and oriented to person, place, and time. No cranial nerve deficit or sensory deficit. exhibits normal muscle tone.   Skin: No rash noted.  not diaphoretic.   Psychiatric:  normal mood and affect.   Vitals reviewed.         Results Review:    I reviewed the patient's new clinical results.  Lab Results (last 24 hours)     Procedure Component Value Units Date/Time    T4, Free " [[id number]] Collected: 11/04/21 1106    Specimen: Blood Updated: 11/04/21 1307    T3, Free [[id number]] Collected: 11/04/21 1106    Specimen: Blood Updated: 11/04/21 1307    POC Glucose Once [[id number]]  (Abnormal) Collected: 11/04/21 1242    Specimen: Blood Updated: 11/04/21 1243     Glucose 187 mg/dL      Comment: Serial Number: [id number]:  [id number]       Osmolality, Serum [[id number]]  (Normal) Collected: 11/04/21 1106    Specimen: Blood Updated: 11/04/21 1237     Osmolality 276 mOsm/kg     Cortisol [[id number]] Collected: 11/04/21 1106    Specimen: Blood Updated: 11/04/21 1225     Cortisol 1.38 mcg/dL     Narrative:      Cortisol Reference Ranges:    Cortisol 6AM - 10AM Range: 6.02-18.40 mcg/dl  Cortisol 4PM - 8PM Range: 2.68-10.50 mcg/dl      Results may be falsely increased if patient taking Biotin.      TSH [[id number]]  (Abnormal) Collected: 11/04/21 1106    Specimen: Blood Updated: 11/04/21 1214     TSH 0.125 uIU/mL     Uric Acid [[id number]]  (Abnormal) Collected: 11/04/21 1106    Specimen: Blood Updated: 11/04/21 1212     Uric Acid 2.6 mg/dL     POC Glucose Once [[id number]]  (Abnormal) Collected: 11/04/21 0748    Specimen: Blood Updated: 11/04/21 0749     Glucose 176 mg/dL      Comment: Serial Number: [id number]:  [id number]       Manual Differential [[id number]]  (Abnormal) Collected: 11/04/21 0510    Specimen: Blood Updated: 11/04/21 0657     Neutrophil % 90.0 %      Lymphocyte % 2.0 %      Monocyte % 3.0 %      Bands %  5.0 %      Neutrophils Absolute 11.02 10*3/mm3      Lymphocytes Absolute 0.23 10*3/mm3      Monocytes Absolute 0.35 10*3/mm3      Anisocytosis Slight/1+     WBC Morphology Normal     Platelet Morphology Normal    CBC & Differential [[id number]]  (Abnormal) Collected: 11/04/21 0510    Specimen: Blood Updated: 11/04/21 0657    Narrative:      The following orders were created for panel order CBC & Differential.  Procedure                               Abnormality         Status                      ---------                               -----------         ------                     CBC Auto Differential[[id number]]        Abnormal            Final result               Scan Slide[[id number]]                                       Final result                 Please view results for these tests on the individual orders.    Scan Slide [[id number]] Collected: 11/04/21 0510    Specimen: Blood Updated: 11/04/21 0657     Scan Slide --     Comment: See Manual Differential Results       CBC Auto Differential [[id number]]  (Abnormal) Collected: 11/04/21 0510    Specimen: Blood Updated: 11/04/21 0657     WBC 11.60 10*3/mm3      RBC 3.77 10*6/mm3      Hemoglobin 10.8 g/dL      Hematocrit 31.9 %      MCV 84.5 fL      MCH 28.7 pg      MCHC 34.0 g/dL      RDW 16.2 %      RDW-SD 47.9 fl      MPV 6.9 fL      Platelets 148 10*3/mm3     Narrative:      The previously reported component NRBC is no longer being reported. Previous result was 0.0 /100 WBC (Reference Range: 0.0-0.2 /100 WBC) on 11/4/2021 at 0541 EDT.    Comprehensive Metabolic Panel [[id number]]  (Abnormal) Collected: 11/04/21 0510    Specimen: Blood Updated: 11/04/21 0542     Glucose 201 mg/dL      BUN 21 mg/dL      Creatinine 0.73 mg/dL      Sodium 128 mmol/L      Potassium 4.8 mmol/L      Chloride 94 mmol/L      CO2 20.0 mmol/L      Calcium 8.1 mg/dL      Total Protein 5.6 g/dL      Albumin 3.60 g/dL      ALT (SGPT) 62 U/L      AST (SGOT) 16 U/L      Alkaline Phosphatase 34 U/L      Total Bilirubin 0.5 mg/dL      eGFR Non African Amer 119 mL/min/1.73      Globulin 2.0 gm/dL      A/G Ratio 1.8 g/dL      BUN/Creatinine Ratio 28.8     Anion Gap 14.0 mmol/L     Narrative:      GFR Normal >60  Chronic Kidney Disease <60  Kidney Failure <15      Phosphorus [[id number]]  (Normal) Collected: 11/04/21 0510    Specimen: Blood Updated: 11/04/21 0542     Phosphorus 3.0 mg/dL     Magnesium [[id number]]  (Normal) Collected: 11/04/21 0510    Specimen: Blood Updated:  11/04/21 0541     Magnesium 2.3 mg/dL     POC Glucose Once [[id number]]  (Abnormal) Collected: 11/03/21 2000    Specimen: Blood Updated: 11/03/21 2001     Glucose 200 mg/dL      Comment: Serial Number: [id number]:  [id number]       POC Glucose Once [[id number]]  (Abnormal) Collected: 11/03/21 1648    Specimen: Blood Updated: 11/03/21 1649     Glucose 162 mg/dL      Comment: Serial Number: [id number]:  [id number]                 Assessment/Plan   Temporoparietal brain mass  Status post craniotomy tumor resection  Pathology pending suspected meningioma  Continue steroids  Continue seizure prophylaxis with Keppra    Hyponatremia  Start on salt tabs consider SIADH due to above  Check labs osmolalities serum and urine, urine sodium, TSH, cortisol, uric acid sodium and urine, creatinine urine  Restrict free water.  Appears euvolemic  Start on salt tabs per primary team    Hypertension  Continue amlodipine    Hyperglycemia  On systemic steroids  Continue sliding scale    GERD  Continue PPI and Carafate    DVT PUD prophylaxis    Plan as above  Jesika Hearn MD  11/04/21  13:16 EDT

## 2022-10-25 ENCOUNTER — HOSPITAL ENCOUNTER (OUTPATIENT)
Age: 52
Discharge: HOME | End: 2022-10-25
Payer: COMMERCIAL

## 2022-10-25 ENCOUNTER — APPOINTMENT (OUTPATIENT)
Dept: CT IMAGING | Age: 52
End: 2022-10-25
Attending: EMERGENCY MEDICINE
Payer: COMMERCIAL

## 2022-10-25 VITALS
SYSTOLIC BLOOD PRESSURE: 120 MMHG | DIASTOLIC BLOOD PRESSURE: 78 MMHG | TEMPERATURE: 98 F | OXYGEN SATURATION: 100 % | RESPIRATION RATE: 18 BRPM | HEART RATE: 95 BPM

## 2022-10-25 DIAGNOSIS — R05.1 ACUTE COUGH: ICD-10-CM

## 2022-10-25 DIAGNOSIS — R07.81 RIB PAIN ON LEFT SIDE: ICD-10-CM

## 2022-10-25 DIAGNOSIS — J06.9 UPPER RESPIRATORY TRACT INFECTION, UNSPECIFIED TYPE: Primary | ICD-10-CM

## 2022-10-25 LAB
#MXD IC: 0.4 X10ˆ3/UL (ref 0.1–1)
BUN BLD-MCNC: 16 MG/DL (ref 7–18)
CHLORIDE BLD-SCNC: 103 MMOL/L (ref 98–112)
CO2 BLD-SCNC: 26 MMOL/L (ref 21–32)
CREAT BLD-MCNC: 1 MG/DL
DDIMER WHOLE BLOOD: 926 NG/ML DDU (ref ?–400)
GFR SERPLBLD BASED ON 1.73 SQ M-ARVRAT: 68 ML/MIN/1.73M2 (ref 60–?)
GLUCOSE BLD-MCNC: 79 MG/DL (ref 70–99)
HCT VFR BLD AUTO: 33.3 %
HCT VFR BLD CALC: 35 %
HGB BLD-MCNC: 10.7 G/DL
ISTAT IONIZED CALCIUM FOR CHEM 8: 1.03 MMOL/L (ref 1.12–1.32)
LYMPHOCYTES # BLD AUTO: 2 X10ˆ3/UL (ref 1–4)
LYMPHOCYTES NFR BLD AUTO: 25.9 %
MCH RBC QN AUTO: 28 PG (ref 26–34)
MCHC RBC AUTO-ENTMCNC: 32.1 G/DL (ref 31–37)
MCV RBC AUTO: 87.2 FL (ref 80–100)
MIXED CELL %: 5.5 %
NEUTROPHILS # BLD AUTO: 5.3 X10ˆ3/UL (ref 1.5–7.7)
NEUTROPHILS NFR BLD AUTO: 68.6 %
PLATELET # BLD AUTO: 191 X10ˆ3/UL (ref 150–450)
POTASSIUM BLD-SCNC: 4 MMOL/L (ref 3.6–5.1)
RBC # BLD AUTO: 3.82 X10ˆ6/UL
SODIUM BLD-SCNC: 137 MMOL/L (ref 136–145)
TROPONIN I BLD-MCNC: <0.02 NG/ML
WBC # BLD AUTO: 7.7 X10ˆ3/UL (ref 4–11)

## 2022-10-25 PROCEDURE — 80047 BASIC METABLC PNL IONIZED CA: CPT | Performed by: EMERGENCY MEDICINE

## 2022-10-25 PROCEDURE — 71260 CT THORAX DX C+: CPT | Performed by: EMERGENCY MEDICINE

## 2022-10-25 PROCEDURE — 99204 OFFICE O/P NEW MOD 45 MIN: CPT | Performed by: EMERGENCY MEDICINE

## 2022-10-25 PROCEDURE — 93000 ELECTROCARDIOGRAM COMPLETE: CPT | Performed by: EMERGENCY MEDICINE

## 2022-10-25 PROCEDURE — 85025 COMPLETE CBC W/AUTO DIFF WBC: CPT | Performed by: EMERGENCY MEDICINE

## 2022-10-25 PROCEDURE — 84484 ASSAY OF TROPONIN QUANT: CPT | Performed by: EMERGENCY MEDICINE

## 2022-10-25 RX ORDER — ALBUTEROL SULFATE 2.5 MG/3ML
2.5 SOLUTION RESPIRATORY (INHALATION) EVERY 4 HOURS PRN
Qty: 30 EACH | Refills: 0 | Status: SHIPPED | OUTPATIENT
Start: 2022-10-25 | End: 2022-11-24

## 2022-10-25 RX ORDER — BENZONATATE 100 MG/1
100 CAPSULE ORAL 3 TIMES DAILY PRN
Qty: 30 CAPSULE | Refills: 0 | Status: SHIPPED | OUTPATIENT
Start: 2022-10-25 | End: 2022-11-24

## 2022-10-25 RX ORDER — METHOCARBAMOL 500 MG/1
500 TABLET, FILM COATED ORAL 3 TIMES DAILY PRN
Qty: 10 TABLET | Refills: 0 | Status: SHIPPED | OUTPATIENT
Start: 2022-10-25

## 2022-10-25 NOTE — ED INITIAL ASSESSMENT (HPI)
Pt came in due to cough, congestion, body aches, and fatigue. Pt stated she tested positive for covid about 2 months ago. Pt has easy non labored respirations.

## 2023-03-05 ENCOUNTER — APPOINTMENT (OUTPATIENT)
Dept: GENERAL RADIOLOGY | Facility: HOSPITAL | Age: 53
End: 2023-03-05
Attending: EMERGENCY MEDICINE
Payer: COMMERCIAL

## 2023-03-05 ENCOUNTER — HOSPITAL ENCOUNTER (EMERGENCY)
Facility: HOSPITAL | Age: 53
Discharge: HOME | End: 2023-03-05
Attending: EMERGENCY MEDICINE
Payer: COMMERCIAL

## 2023-03-05 VITALS
HEIGHT: 69 IN | SYSTOLIC BLOOD PRESSURE: 108 MMHG | RESPIRATION RATE: 16 BRPM | BODY MASS INDEX: 19.99 KG/M2 | DIASTOLIC BLOOD PRESSURE: 71 MMHG | TEMPERATURE: 98 F | HEART RATE: 78 BPM | OXYGEN SATURATION: 100 % | WEIGHT: 135 LBS

## 2023-03-05 DIAGNOSIS — M54.16 LUMBAR RADICULOPATHY: Primary | ICD-10-CM

## 2023-03-05 PROCEDURE — 72100 X-RAY EXAM L-S SPINE 2/3 VWS: CPT | Performed by: EMERGENCY MEDICINE

## 2023-03-05 PROCEDURE — 96372 THER/PROPH/DIAG INJ SC/IM: CPT | Performed by: EMERGENCY MEDICINE

## 2023-03-05 PROCEDURE — 99284 EMERGENCY DEPT VISIT MOD MDM: CPT | Performed by: EMERGENCY MEDICINE

## 2023-03-05 PROCEDURE — 99283 EMERGENCY DEPT VISIT LOW MDM: CPT | Performed by: EMERGENCY MEDICINE

## 2023-03-05 RX ORDER — METHYLPREDNISOLONE 4 MG/1
TABLET ORAL
Qty: 1 EACH | Refills: 0 | Status: SHIPPED | OUTPATIENT
Start: 2023-03-05

## 2023-03-05 RX ORDER — MORPHINE SULFATE 4 MG/ML
4 INJECTION, SOLUTION INTRAMUSCULAR; INTRAVENOUS ONCE
Status: COMPLETED | OUTPATIENT
Start: 2023-03-05 | End: 2023-03-05

## 2023-03-05 RX ORDER — HYDROCODONE BITARTRATE AND ACETAMINOPHEN 5; 325 MG/1; MG/1
1 TABLET ORAL EVERY 6 HOURS PRN
Qty: 10 TABLET | Refills: 0 | Status: SHIPPED | OUTPATIENT
Start: 2023-03-05 | End: 2023-03-12

## 2023-03-05 NOTE — ED INITIAL ASSESSMENT (HPI)
Aox4. Arrives in wheelchair. Complaints of acute on chronic lower/mid back pain. States injury 1 yr prior, none recent. Pain radiates down left leg. Taking gabapentin with mild relief. Denies incontinence.

## 2023-06-15 ENCOUNTER — LAB ENCOUNTER (OUTPATIENT)
Dept: LAB | Facility: HOSPITAL | Age: 53
End: 2023-06-15
Attending: ORTHOPAEDIC SURGERY
Payer: COMMERCIAL

## 2023-06-15 DIAGNOSIS — Z01.818 PREOP TESTING: ICD-10-CM

## 2023-06-15 DIAGNOSIS — R79.1 ABNORMAL COAGULATION PROFILE: ICD-10-CM

## 2023-06-15 DIAGNOSIS — Z01.818 ENCOUNTER FOR OTHER PREPROCEDURAL EXAMINATION: Primary | ICD-10-CM

## 2023-06-15 LAB
ALBUMIN SERPL-MCNC: 3.6 G/DL (ref 3.4–5)
ALBUMIN/GLOB SERPL: 1 {RATIO} (ref 1–2)
ALP LIVER SERPL-CCNC: 99 U/L
ALT SERPL-CCNC: 43 U/L
ANION GAP SERPL CALC-SCNC: 4 MMOL/L (ref 0–18)
ANTIBODY SCREEN: NEGATIVE
AST SERPL-CCNC: 20 U/L (ref 15–37)
ATRIAL RATE: 71 BPM
BASOPHILS # BLD AUTO: 0.01 X10(3) UL (ref 0–0.2)
BASOPHILS NFR BLD AUTO: 0.1 %
BILIRUB SERPL-MCNC: 0.2 MG/DL (ref 0.1–2)
BUN BLD-MCNC: 11 MG/DL (ref 7–18)
BUN/CREAT SERPL: 16.4 (ref 10–20)
CALCIUM BLD-MCNC: 8.6 MG/DL (ref 8.5–10.1)
CHLORIDE SERPL-SCNC: 105 MMOL/L (ref 98–112)
CO2 SERPL-SCNC: 31 MMOL/L (ref 21–32)
CREAT BLD-MCNC: 0.67 MG/DL
DEPRECATED RDW RBC AUTO: 47.8 FL (ref 35.1–46.3)
EOSINOPHIL # BLD AUTO: 0.03 X10(3) UL (ref 0–0.7)
EOSINOPHIL NFR BLD AUTO: 0.4 %
ERYTHROCYTE [DISTWIDTH] IN BLOOD BY AUTOMATED COUNT: 14.6 % (ref 11–15)
FASTING STATUS PATIENT QL REPORTED: NO
GFR SERPLBLD BASED ON 1.73 SQ M-ARVRAT: 105 ML/MIN/1.73M2 (ref 60–?)
GLOBULIN PLAS-MCNC: 3.7 G/DL (ref 2.8–4.4)
GLUCOSE BLD-MCNC: 68 MG/DL (ref 70–99)
HCT VFR BLD AUTO: 35.3 %
HGB BLD-MCNC: 11.4 G/DL
IMM GRANULOCYTES # BLD AUTO: 0.04 X10(3) UL (ref 0–1)
IMM GRANULOCYTES NFR BLD: 0.6 %
LYMPHOCYTES # BLD AUTO: 2.09 X10(3) UL (ref 1–4)
LYMPHOCYTES NFR BLD AUTO: 28.9 %
MCH RBC QN AUTO: 29.2 PG (ref 26–34)
MCHC RBC AUTO-ENTMCNC: 32.3 G/DL (ref 31–37)
MCV RBC AUTO: 90.5 FL
MONOCYTES # BLD AUTO: 0.43 X10(3) UL (ref 0.1–1)
MONOCYTES NFR BLD AUTO: 5.9 %
NEUTROPHILS # BLD AUTO: 4.63 X10 (3) UL (ref 1.5–7.7)
NEUTROPHILS # BLD AUTO: 4.63 X10(3) UL (ref 1.5–7.7)
NEUTROPHILS NFR BLD AUTO: 64.1 %
OSMOLALITY SERPL CALC.SUM OF ELEC: 288 MOSM/KG (ref 275–295)
P AXIS: 72 DEGREES
P-R INTERVAL: 162 MS
PLATELET # BLD AUTO: 183 10(3)UL (ref 150–450)
POTASSIUM SERPL-SCNC: 4 MMOL/L (ref 3.5–5.1)
PROT SERPL-MCNC: 7.3 G/DL (ref 6.4–8.2)
Q-T INTERVAL: 408 MS
QRS DURATION: 84 MS
QTC CALCULATION (BEZET): 443 MS
R AXIS: 57 DEGREES
RBC # BLD AUTO: 3.9 X10(6)UL
RH BLOOD TYPE: POSITIVE
RH BLOOD TYPE: POSITIVE
SODIUM SERPL-SCNC: 140 MMOL/L (ref 136–145)
T AXIS: 53 DEGREES
VENTRICULAR RATE: 71 BPM
WBC # BLD AUTO: 7.2 X10(3) UL (ref 4–11)

## 2023-06-15 PROCEDURE — 86850 RBC ANTIBODY SCREEN: CPT

## 2023-06-15 PROCEDURE — 36415 COLL VENOUS BLD VENIPUNCTURE: CPT

## 2023-06-15 PROCEDURE — 87641 MR-STAPH DNA AMP PROBE: CPT

## 2023-06-15 PROCEDURE — 86901 BLOOD TYPING SEROLOGIC RH(D): CPT

## 2023-06-15 PROCEDURE — 93005 ELECTROCARDIOGRAM TRACING: CPT

## 2023-06-15 PROCEDURE — 86900 BLOOD TYPING SEROLOGIC ABO: CPT

## 2023-06-15 PROCEDURE — 80053 COMPREHEN METABOLIC PANEL: CPT

## 2023-06-15 PROCEDURE — 93010 ELECTROCARDIOGRAM REPORT: CPT | Performed by: INTERNAL MEDICINE

## 2023-06-15 PROCEDURE — 85025 COMPLETE CBC W/AUTO DIFF WBC: CPT

## 2023-06-15 RX ORDER — PREDNISONE 10 MG/1
10 TABLET ORAL NIGHTLY
COMMUNITY

## 2023-06-16 LAB — MRSA DNA SPEC QL NAA+PROBE: NEGATIVE

## 2023-06-19 ENCOUNTER — ANESTHESIA (OUTPATIENT)
Dept: SURGERY | Facility: HOSPITAL | Age: 53
End: 2023-06-19
Payer: COMMERCIAL

## 2023-06-19 ENCOUNTER — APPOINTMENT (OUTPATIENT)
Dept: GENERAL RADIOLOGY | Facility: HOSPITAL | Age: 53
End: 2023-06-19
Attending: ORTHOPAEDIC SURGERY
Payer: COMMERCIAL

## 2023-06-19 ENCOUNTER — ANESTHESIA EVENT (OUTPATIENT)
Dept: SURGERY | Facility: HOSPITAL | Age: 53
End: 2023-06-19
Payer: COMMERCIAL

## 2023-06-19 ENCOUNTER — HOSPITAL ENCOUNTER (INPATIENT)
Facility: HOSPITAL | Age: 53
LOS: 2 days | Discharge: HOME | End: 2023-06-21
Attending: ORTHOPAEDIC SURGERY | Admitting: ORTHOPAEDIC SURGERY
Payer: COMMERCIAL

## 2023-06-19 DIAGNOSIS — Z01.818 PREOP TESTING: Primary | ICD-10-CM

## 2023-06-19 PROBLEM — M47.12 CERVICAL ARTHRITIS WITH MYELOPATHY: Status: ACTIVE | Noted: 2023-06-19

## 2023-06-19 PROCEDURE — 99232 SBSQ HOSP IP/OBS MODERATE 35: CPT | Performed by: HOSPITALIST

## 2023-06-19 PROCEDURE — 00NW0ZZ RELEASE CERVICAL SPINAL CORD, OPEN APPROACH: ICD-10-PCS | Performed by: ORTHOPAEDIC SURGERY

## 2023-06-19 PROCEDURE — 4A11X4G MONITORING OF PERIPHERAL NERVOUS ELECTRICAL ACTIVITY, INTRAOPERATIVE, EXTERNAL APPROACH: ICD-10-PCS | Performed by: ORTHOPAEDIC SURGERY

## 2023-06-19 PROCEDURE — 76000 FLUOROSCOPY <1 HR PHYS/QHP: CPT | Performed by: ORTHOPAEDIC SURGERY

## 2023-06-19 PROCEDURE — 0RG2071 FUSION OF 2 OR MORE CERVICAL VERTEBRAL JOINTS WITH AUTOLOGOUS TISSUE SUBSTITUTE, POSTERIOR APPROACH, POSTERIOR COLUMN, OPEN APPROACH: ICD-10-PCS | Performed by: ORTHOPAEDIC SURGERY

## 2023-06-19 RX ORDER — HYDROMORPHONE HYDROCHLORIDE 1 MG/ML
0.4 INJECTION, SOLUTION INTRAMUSCULAR; INTRAVENOUS; SUBCUTANEOUS EVERY 5 MIN PRN
Status: DISCONTINUED | OUTPATIENT
Start: 2023-06-19 | End: 2023-06-19 | Stop reason: HOSPADM

## 2023-06-19 RX ORDER — BUPIVACAINE HYDROCHLORIDE AND EPINEPHRINE 5; 5 MG/ML; UG/ML
INJECTION, SOLUTION PERINEURAL AS NEEDED
Status: DISCONTINUED | OUTPATIENT
Start: 2023-06-19 | End: 2023-06-19 | Stop reason: HOSPADM

## 2023-06-19 RX ORDER — ENEMA 19; 7 G/133ML; G/133ML
1 ENEMA RECTAL ONCE AS NEEDED
Status: DISCONTINUED | OUTPATIENT
Start: 2023-06-19 | End: 2023-06-21

## 2023-06-19 RX ORDER — MORPHINE SULFATE 4 MG/ML
4 INJECTION, SOLUTION INTRAMUSCULAR; INTRAVENOUS EVERY 10 MIN PRN
Status: DISCONTINUED | OUTPATIENT
Start: 2023-06-19 | End: 2023-06-19 | Stop reason: HOSPADM

## 2023-06-19 RX ORDER — PHENYLEPHRINE HCL 10 MG/ML
VIAL (ML) INJECTION AS NEEDED
Status: DISCONTINUED | OUTPATIENT
Start: 2023-06-19 | End: 2023-06-19 | Stop reason: SURG

## 2023-06-19 RX ORDER — CEFAZOLIN SODIUM/WATER 2 G/20 ML
2 SYRINGE (ML) INTRAVENOUS EVERY 8 HOURS
Status: COMPLETED | OUTPATIENT
Start: 2023-06-20 | End: 2023-06-20

## 2023-06-19 RX ORDER — HYDROMORPHONE HYDROCHLORIDE 1 MG/ML
0.2 INJECTION, SOLUTION INTRAMUSCULAR; INTRAVENOUS; SUBCUTANEOUS EVERY 5 MIN PRN
Status: DISCONTINUED | OUTPATIENT
Start: 2023-06-19 | End: 2023-06-19 | Stop reason: HOSPADM

## 2023-06-19 RX ORDER — PROCHLORPERAZINE EDISYLATE 5 MG/ML
5 INJECTION INTRAMUSCULAR; INTRAVENOUS EVERY 8 HOURS PRN
Status: DISCONTINUED | OUTPATIENT
Start: 2023-06-19 | End: 2023-06-19 | Stop reason: HOSPADM

## 2023-06-19 RX ORDER — HYDROMORPHONE HYDROCHLORIDE 1 MG/ML
0.8 INJECTION, SOLUTION INTRAMUSCULAR; INTRAVENOUS; SUBCUTANEOUS EVERY 2 HOUR PRN
Status: DISCONTINUED | OUTPATIENT
Start: 2023-06-19 | End: 2023-06-21

## 2023-06-19 RX ORDER — VANCOMYCIN HYDROCHLORIDE 1 G/20ML
INJECTION, POWDER, LYOPHILIZED, FOR SOLUTION INTRAVENOUS AS NEEDED
Status: DISCONTINUED | OUTPATIENT
Start: 2023-06-19 | End: 2023-06-19 | Stop reason: HOSPADM

## 2023-06-19 RX ORDER — LIDOCAINE HYDROCHLORIDE 10 MG/ML
INJECTION, SOLUTION EPIDURAL; INFILTRATION; INTRACAUDAL; PERINEURAL AS NEEDED
Status: DISCONTINUED | OUTPATIENT
Start: 2023-06-19 | End: 2023-06-19 | Stop reason: SURG

## 2023-06-19 RX ORDER — BISACODYL 10 MG
10 SUPPOSITORY, RECTAL RECTAL
Status: DISCONTINUED | OUTPATIENT
Start: 2023-06-19 | End: 2023-06-21

## 2023-06-19 RX ORDER — EPHEDRINE SULFATE 50 MG/ML
INJECTION, SOLUTION INTRAVENOUS AS NEEDED
Status: DISCONTINUED | OUTPATIENT
Start: 2023-06-19 | End: 2023-06-19 | Stop reason: SURG

## 2023-06-19 RX ORDER — ALBUTEROL SULFATE 90 UG/1
2 AEROSOL, METERED RESPIRATORY (INHALATION) EVERY 6 HOURS PRN
Status: DISCONTINUED | OUTPATIENT
Start: 2023-06-19 | End: 2023-06-21

## 2023-06-19 RX ORDER — MORPHINE SULFATE 4 MG/ML
2 INJECTION, SOLUTION INTRAMUSCULAR; INTRAVENOUS EVERY 10 MIN PRN
Status: DISCONTINUED | OUTPATIENT
Start: 2023-06-19 | End: 2023-06-19 | Stop reason: HOSPADM

## 2023-06-19 RX ORDER — ROCURONIUM BROMIDE 10 MG/ML
INJECTION, SOLUTION INTRAVENOUS AS NEEDED
Status: DISCONTINUED | OUTPATIENT
Start: 2023-06-19 | End: 2023-06-19 | Stop reason: SURG

## 2023-06-19 RX ORDER — OXCARBAZEPINE 300 MG/1
300 TABLET, FILM COATED ORAL NIGHTLY
Status: DISCONTINUED | OUTPATIENT
Start: 2023-06-19 | End: 2023-06-21

## 2023-06-19 RX ORDER — LEVOTHYROXINE SODIUM 0.1 MG/1
100 TABLET ORAL
Status: DISCONTINUED | OUTPATIENT
Start: 2023-06-20 | End: 2023-06-21

## 2023-06-19 RX ORDER — DIPHENHYDRAMINE HCL 25 MG
25 CAPSULE ORAL EVERY 4 HOURS PRN
Status: DISCONTINUED | OUTPATIENT
Start: 2023-06-19 | End: 2023-06-21

## 2023-06-19 RX ORDER — OXYCODONE HYDROCHLORIDE 5 MG/1
5 TABLET ORAL EVERY 4 HOURS PRN
Status: DISCONTINUED | OUTPATIENT
Start: 2023-06-19 | End: 2023-06-21

## 2023-06-19 RX ORDER — MORPHINE SULFATE 10 MG/ML
6 INJECTION, SOLUTION INTRAMUSCULAR; INTRAVENOUS EVERY 10 MIN PRN
Status: DISCONTINUED | OUTPATIENT
Start: 2023-06-19 | End: 2023-06-19 | Stop reason: HOSPADM

## 2023-06-19 RX ORDER — ONDANSETRON 2 MG/ML
4 INJECTION INTRAMUSCULAR; INTRAVENOUS EVERY 6 HOURS PRN
Status: DISCONTINUED | OUTPATIENT
Start: 2023-06-19 | End: 2023-06-19 | Stop reason: HOSPADM

## 2023-06-19 RX ORDER — DIPHENHYDRAMINE HYDROCHLORIDE 50 MG/ML
25 INJECTION INTRAMUSCULAR; INTRAVENOUS EVERY 4 HOURS PRN
Status: DISCONTINUED | OUTPATIENT
Start: 2023-06-19 | End: 2023-06-21

## 2023-06-19 RX ORDER — NALOXONE HYDROCHLORIDE 0.4 MG/ML
80 INJECTION, SOLUTION INTRAMUSCULAR; INTRAVENOUS; SUBCUTANEOUS AS NEEDED
Status: DISCONTINUED | OUTPATIENT
Start: 2023-06-19 | End: 2023-06-19 | Stop reason: HOSPADM

## 2023-06-19 RX ORDER — SENNOSIDES 8.6 MG
17.2 TABLET ORAL NIGHTLY
Status: DISCONTINUED | OUTPATIENT
Start: 2023-06-19 | End: 2023-06-21

## 2023-06-19 RX ORDER — MIDAZOLAM HYDROCHLORIDE 1 MG/ML
INJECTION INTRAMUSCULAR; INTRAVENOUS AS NEEDED
Status: DISCONTINUED | OUTPATIENT
Start: 2023-06-19 | End: 2023-06-19 | Stop reason: SURG

## 2023-06-19 RX ORDER — SODIUM CHLORIDE 9 MG/ML
INJECTION, SOLUTION INTRAVENOUS CONTINUOUS PRN
Status: DISCONTINUED | OUTPATIENT
Start: 2023-06-19 | End: 2023-06-19 | Stop reason: SURG

## 2023-06-19 RX ORDER — LEVETIRACETAM 500 MG/1
500 TABLET ORAL DAILY
Status: DISCONTINUED | OUTPATIENT
Start: 2023-06-20 | End: 2023-06-21

## 2023-06-19 RX ORDER — HYDROMORPHONE HYDROCHLORIDE 1 MG/ML
0.4 INJECTION, SOLUTION INTRAMUSCULAR; INTRAVENOUS; SUBCUTANEOUS EVERY 2 HOUR PRN
Status: DISCONTINUED | OUTPATIENT
Start: 2023-06-19 | End: 2023-06-21

## 2023-06-19 RX ORDER — OXCARBAZEPINE 300 MG/1
600 TABLET, FILM COATED ORAL DAILY
Status: DISCONTINUED | OUTPATIENT
Start: 2023-06-20 | End: 2023-06-21

## 2023-06-19 RX ORDER — HYDROMORPHONE HYDROCHLORIDE 1 MG/ML
0.6 INJECTION, SOLUTION INTRAMUSCULAR; INTRAVENOUS; SUBCUTANEOUS EVERY 5 MIN PRN
Status: DISCONTINUED | OUTPATIENT
Start: 2023-06-19 | End: 2023-06-19 | Stop reason: HOSPADM

## 2023-06-19 RX ORDER — PROCHLORPERAZINE EDISYLATE 5 MG/ML
5 INJECTION INTRAMUSCULAR; INTRAVENOUS EVERY 8 HOURS PRN
Status: DISCONTINUED | OUTPATIENT
Start: 2023-06-19 | End: 2023-06-21

## 2023-06-19 RX ORDER — ONDANSETRON 2 MG/ML
4 INJECTION INTRAMUSCULAR; INTRAVENOUS EVERY 6 HOURS PRN
Status: DISCONTINUED | OUTPATIENT
Start: 2023-06-19 | End: 2023-06-21

## 2023-06-19 RX ORDER — DOCUSATE SODIUM 100 MG/1
100 CAPSULE, LIQUID FILLED ORAL 2 TIMES DAILY
Status: DISCONTINUED | OUTPATIENT
Start: 2023-06-19 | End: 2023-06-21

## 2023-06-19 RX ORDER — CEFAZOLIN SODIUM/WATER 2 G/20 ML
2 SYRINGE (ML) INTRAVENOUS ONCE
Status: COMPLETED | OUTPATIENT
Start: 2023-06-19 | End: 2023-06-19

## 2023-06-19 RX ORDER — SODIUM CHLORIDE, SODIUM LACTATE, POTASSIUM CHLORIDE, CALCIUM CHLORIDE 600; 310; 30; 20 MG/100ML; MG/100ML; MG/100ML; MG/100ML
INJECTION, SOLUTION INTRAVENOUS CONTINUOUS
Status: DISCONTINUED | OUTPATIENT
Start: 2023-06-19 | End: 2023-06-21

## 2023-06-19 RX ORDER — SODIUM CHLORIDE 9 MG/ML
INJECTION, SOLUTION INTRAVENOUS CONTINUOUS
Status: DISCONTINUED | OUTPATIENT
Start: 2023-06-19 | End: 2023-06-21

## 2023-06-19 RX ORDER — LABETALOL HYDROCHLORIDE 5 MG/ML
5 INJECTION, SOLUTION INTRAVENOUS EVERY 5 MIN PRN
Status: DISCONTINUED | OUTPATIENT
Start: 2023-06-19 | End: 2023-06-19 | Stop reason: HOSPADM

## 2023-06-19 RX ORDER — POLYETHYLENE GLYCOL 3350 17 G/17G
17 POWDER, FOR SOLUTION ORAL DAILY PRN
Status: DISCONTINUED | OUTPATIENT
Start: 2023-06-19 | End: 2023-06-21

## 2023-06-19 RX ORDER — CYCLOBENZAPRINE HCL 10 MG
10 TABLET ORAL 3 TIMES DAILY PRN
Status: DISCONTINUED | OUTPATIENT
Start: 2023-06-19 | End: 2023-06-21

## 2023-06-19 RX ORDER — SODIUM CHLORIDE, SODIUM LACTATE, POTASSIUM CHLORIDE, CALCIUM CHLORIDE 600; 310; 30; 20 MG/100ML; MG/100ML; MG/100ML; MG/100ML
INJECTION, SOLUTION INTRAVENOUS CONTINUOUS
Status: DISCONTINUED | OUTPATIENT
Start: 2023-06-19 | End: 2023-06-19 | Stop reason: HOSPADM

## 2023-06-19 RX ORDER — OXYCODONE HYDROCHLORIDE 5 MG/1
10 TABLET ORAL EVERY 4 HOURS PRN
Status: DISCONTINUED | OUTPATIENT
Start: 2023-06-19 | End: 2023-06-21

## 2023-06-19 RX ORDER — ACETAMINOPHEN 500 MG
1000 TABLET ORAL ONCE
Status: COMPLETED | OUTPATIENT
Start: 2023-06-19 | End: 2023-06-19

## 2023-06-19 RX ORDER — PREDNISONE 10 MG/1
10 TABLET ORAL NIGHTLY
Status: DISCONTINUED | OUTPATIENT
Start: 2023-06-19 | End: 2023-06-21

## 2023-06-19 RX ADMIN — CEFAZOLIN SODIUM/WATER 2 G: 2 G/20 ML SYRINGE (ML) INTRAVENOUS at 18:27:00

## 2023-06-19 RX ADMIN — MIDAZOLAM HYDROCHLORIDE 2 MG: 1 INJECTION INTRAMUSCULAR; INTRAVENOUS at 14:58:00

## 2023-06-19 RX ADMIN — LIDOCAINE HYDROCHLORIDE 50 MG: 10 INJECTION, SOLUTION EPIDURAL; INFILTRATION; INTRACAUDAL; PERINEURAL at 15:03:00

## 2023-06-19 RX ADMIN — SODIUM CHLORIDE, SODIUM LACTATE, POTASSIUM CHLORIDE, CALCIUM CHLORIDE: 600; 310; 30; 20 INJECTION, SOLUTION INTRAVENOUS at 18:17:00

## 2023-06-19 RX ADMIN — SODIUM CHLORIDE: 9 INJECTION, SOLUTION INTRAVENOUS at 19:14:00

## 2023-06-19 RX ADMIN — CEFAZOLIN SODIUM/WATER 2 G: 2 G/20 ML SYRINGE (ML) INTRAVENOUS at 14:56:00

## 2023-06-19 RX ADMIN — SODIUM CHLORIDE: 9 INJECTION, SOLUTION INTRAVENOUS at 15:15:00

## 2023-06-19 RX ADMIN — EPHEDRINE SULFATE 15 MG: 50 INJECTION, SOLUTION INTRAVENOUS at 15:46:00

## 2023-06-19 RX ADMIN — SODIUM CHLORIDE: 9 INJECTION, SOLUTION INTRAVENOUS at 18:32:00

## 2023-06-19 RX ADMIN — EPHEDRINE SULFATE 10 MG: 50 INJECTION, SOLUTION INTRAVENOUS at 15:34:00

## 2023-06-19 RX ADMIN — ROCURONIUM BROMIDE 20 MG: 10 INJECTION, SOLUTION INTRAVENOUS at 16:14:00

## 2023-06-19 RX ADMIN — PHENYLEPHRINE HCL 100 MCG: 10 MG/ML VIAL (ML) INJECTION at 18:07:00

## 2023-06-19 RX ADMIN — SODIUM CHLORIDE, SODIUM LACTATE, POTASSIUM CHLORIDE, CALCIUM CHLORIDE: 600; 310; 30; 20 INJECTION, SOLUTION INTRAVENOUS at 19:14:00

## 2023-06-19 RX ADMIN — PHENYLEPHRINE HCL 100 MCG: 10 MG/ML VIAL (ML) INJECTION at 17:28:00

## 2023-06-19 RX ADMIN — SODIUM CHLORIDE, SODIUM LACTATE, POTASSIUM CHLORIDE, CALCIUM CHLORIDE: 600; 310; 30; 20 INJECTION, SOLUTION INTRAVENOUS at 14:56:00

## 2023-06-19 NOTE — ANESTHESIA PROCEDURE NOTES
Airway  Date/Time: 6/19/2023 3:05 PM  Urgency: Elective    Airway not difficult    General Information and Staff    Patient location during procedure: OR  Anesthesiologist: Yahaira Chaney MD  Resident/CRNA: Saloni Qiu CRNA  Performed: CRNA   Performed by: Saloni Qiu CRNA  Authorized by: Yahaira Chaney MD      Indications and Patient Condition  Indications for airway management: anesthesia  Spontaneous Ventilation: absent  Sedation level: deep  Preoxygenated: yes  Patient position: sniffing  MILS maintained throughout  Mask difficulty assessment: 0 - not attempted  No planned trial extubation    Final Airway Details  Final airway type: endotracheal airway      Successful airway: ETT  Cuffed: yes   Successful intubation technique: Video laryngoscopy  Facilitating devices/methods: intubating stylet  Endotracheal tube insertion site: oral  Blade: Ketty  Blade size: #3  ETT size (mm): 7.0    Cormack-Lehane Classification: grade IIA - partial view of glottis  Placement verified by: capnometry   Cuff volume (mL): 5  Measured from: teeth  ETT to teeth (cm): 21  Number of attempts at approach: 1  Ventilation between attempts: none  Number of other approaches attempted: 0

## 2023-06-20 LAB
ANION GAP SERPL CALC-SCNC: 3 MMOL/L (ref 0–18)
BUN BLD-MCNC: 13 MG/DL (ref 7–18)
BUN/CREAT SERPL: 13.8 (ref 10–20)
CALCIUM BLD-MCNC: 8.1 MG/DL (ref 8.5–10.1)
CHLORIDE SERPL-SCNC: 107 MMOL/L (ref 98–112)
CO2 SERPL-SCNC: 29 MMOL/L (ref 21–32)
CREAT BLD-MCNC: 0.94 MG/DL
DEPRECATED RDW RBC AUTO: 48.9 FL (ref 35.1–46.3)
ERYTHROCYTE [DISTWIDTH] IN BLOOD BY AUTOMATED COUNT: 14.6 % (ref 11–15)
GFR SERPLBLD BASED ON 1.73 SQ M-ARVRAT: 73 ML/MIN/1.73M2 (ref 60–?)
GLUCOSE BLD-MCNC: 101 MG/DL (ref 70–99)
HCT VFR BLD AUTO: 33.2 %
HGB BLD-MCNC: 10.6 G/DL
MCH RBC QN AUTO: 29.1 PG (ref 26–34)
MCHC RBC AUTO-ENTMCNC: 31.9 G/DL (ref 31–37)
MCV RBC AUTO: 91.2 FL
OSMOLALITY SERPL CALC.SUM OF ELEC: 288 MOSM/KG (ref 275–295)
PLATELET # BLD AUTO: 180 10(3)UL (ref 150–450)
POTASSIUM SERPL-SCNC: 4.5 MMOL/L (ref 3.5–5.1)
RBC # BLD AUTO: 3.64 X10(6)UL
SODIUM SERPL-SCNC: 139 MMOL/L (ref 136–145)
WBC # BLD AUTO: 9.7 X10(3) UL (ref 4–11)

## 2023-06-20 PROCEDURE — 99232 SBSQ HOSP IP/OBS MODERATE 35: CPT | Performed by: HOSPITALIST

## 2023-06-20 NOTE — PLAN OF CARE
Post-op day 1. Alert and oriented x4 on room air. Aspen collar on at all times. Denies numbness/tingling. SCDs for DVT prophylaxis, refusing TEDs at this time. Barron taken out this AM, check void by noon. PRN oxy and flexeril given for pain. Hemovac in place and draining. Call light within reach.      Problem: Patient Centered Care  Goal: Patient preferences are identified and integrated in the patient's plan of care  Description: Interventions:  - What would you like us to know as we care for you?   - Provide timely, complete, and accurate information to patient/family  - Incorporate patient and family knowledge, values, beliefs, and cultural backgrounds into the planning and delivery of care  - Encourage patient/family to participate in care and decision-making at the level they choose  - Honor patient and family perspectives and choices  Outcome: Progressing     Problem: Patient/Family Goals  Goal: Patient/Family Long Term Goal  Description: Patient's Long Term Goal:     Interventions:  -   - See additional Care Plan goals for specific interventions  Outcome: Progressing  Goal: Patient/Family Short Term Goal  Description: Patient's Short Term Goal:     Interventions:   -   - See additional Care Plan goals for specific interventions  Outcome: Progressing     Problem: PAIN - ADULT  Goal: Verbalizes/displays adequate comfort level or patient's stated pain goal  Description: INTERVENTIONS:  - Encourage pt to monitor pain and request assistance  - Assess pain using appropriate pain scale  - Administer analgesics based on type and severity of pain and evaluate response  - Implement non-pharmacological measures as appropriate and evaluate response  - Consider cultural and social influences on pain and pain management  - Manage/alleviate anxiety  - Utilize distraction and/or relaxation techniques  - Monitor for opioid side effects  - Notify MD/LIP if interventions unsuccessful or patient reports new pain  - Anticipate increased pain with activity and pre-medicate as appropriate  Outcome: Progressing

## 2023-06-20 NOTE — PLAN OF CARE
Patient is A&OX4, Diony  Aspen collar on at all times, patient states neck brace hurts, had PT/OT adjust collar for patient, this seemed to help a little. Denies numbness/tingling. SCDs for DVT prophylaxis, refusing TEDs at this time. Barron taken out this AM, check void by noon. PO oxy and flexeril given for pain along with IV dilaudid one time dose to help catch up. Hemovac in place and draining. Call light within reach bed in lowest position and call light within reach. Plan is to go home once clearances have been made and PT/OT clearances have been made.    Problem: Patient Centered Care  Goal: Patient preferences are identified and integrated in the patient's plan of care  Description: Interventions:  - What would you like us to know as we care for you?   - Provide timely, complete, and accurate information to patient/family  - Incorporate patient and family knowledge, values, beliefs, and cultural backgrounds into the planning and delivery of care  - Encourage patient/family to participate in care and decision-making at the level they choose  - Honor patient and family perspectives and choices  Outcome: Progressing

## 2023-06-21 VITALS
SYSTOLIC BLOOD PRESSURE: 135 MMHG | HEART RATE: 77 BPM | TEMPERATURE: 99 F | OXYGEN SATURATION: 98 % | RESPIRATION RATE: 20 BRPM | BODY MASS INDEX: 20 KG/M2 | WEIGHT: 132 LBS | DIASTOLIC BLOOD PRESSURE: 87 MMHG | HEIGHT: 68 IN

## 2023-06-21 LAB
ANION GAP SERPL CALC-SCNC: 6 MMOL/L (ref 0–18)
BASOPHILS # BLD AUTO: 0.02 X10(3) UL (ref 0–0.2)
BASOPHILS NFR BLD AUTO: 0.3 %
BUN BLD-MCNC: 15 MG/DL (ref 7–18)
BUN/CREAT SERPL: 19.2 (ref 10–20)
CALCIUM BLD-MCNC: 8.1 MG/DL (ref 8.5–10.1)
CHLORIDE SERPL-SCNC: 103 MMOL/L (ref 98–112)
CO2 SERPL-SCNC: 31 MMOL/L (ref 21–32)
CREAT BLD-MCNC: 0.78 MG/DL
DEPRECATED RDW RBC AUTO: 48.5 FL (ref 35.1–46.3)
DEPRECATED RDW RBC AUTO: 48.5 FL (ref 35.1–46.3)
EOSINOPHIL # BLD AUTO: 0.03 X10(3) UL (ref 0–0.7)
EOSINOPHIL NFR BLD AUTO: 0.4 %
ERYTHROCYTE [DISTWIDTH] IN BLOOD BY AUTOMATED COUNT: 14.5 % (ref 11–15)
ERYTHROCYTE [DISTWIDTH] IN BLOOD BY AUTOMATED COUNT: 14.5 % (ref 11–15)
GFR SERPLBLD BASED ON 1.73 SQ M-ARVRAT: 91 ML/MIN/1.73M2 (ref 60–?)
GLUCOSE BLD-MCNC: 104 MG/DL (ref 70–99)
HCT VFR BLD AUTO: 33 %
HCT VFR BLD AUTO: 33 %
HGB BLD-MCNC: 10.7 G/DL
HGB BLD-MCNC: 10.7 G/DL
IMM GRANULOCYTES # BLD AUTO: 0.04 X10(3) UL (ref 0–1)
IMM GRANULOCYTES NFR BLD: 0.5 %
LYMPHOCYTES # BLD AUTO: 1.23 X10(3) UL (ref 1–4)
LYMPHOCYTES NFR BLD AUTO: 15.7 %
MCH RBC QN AUTO: 29.4 PG (ref 26–34)
MCH RBC QN AUTO: 29.4 PG (ref 26–34)
MCHC RBC AUTO-ENTMCNC: 32.4 G/DL (ref 31–37)
MCHC RBC AUTO-ENTMCNC: 32.4 G/DL (ref 31–37)
MCV RBC AUTO: 90.7 FL
MCV RBC AUTO: 90.7 FL
MONOCYTES # BLD AUTO: 0.47 X10(3) UL (ref 0.1–1)
MONOCYTES NFR BLD AUTO: 6 %
NEUTROPHILS # BLD AUTO: 6.03 X10 (3) UL (ref 1.5–7.7)
NEUTROPHILS # BLD AUTO: 6.03 X10(3) UL (ref 1.5–7.7)
NEUTROPHILS NFR BLD AUTO: 77.1 %
OSMOLALITY SERPL CALC.SUM OF ELEC: 291 MOSM/KG (ref 275–295)
PLATELET # BLD AUTO: 181 10(3)UL (ref 150–450)
PLATELET # BLD AUTO: 181 10(3)UL (ref 150–450)
POTASSIUM SERPL-SCNC: 4.2 MMOL/L (ref 3.5–5.1)
RBC # BLD AUTO: 3.64 X10(6)UL
RBC # BLD AUTO: 3.64 X10(6)UL
SODIUM SERPL-SCNC: 140 MMOL/L (ref 136–145)
WBC # BLD AUTO: 7.8 X10(3) UL (ref 4–11)
WBC # BLD AUTO: 7.8 X10(3) UL (ref 4–11)

## 2023-06-21 PROCEDURE — 99239 HOSP IP/OBS DSCHRG MGMT >30: CPT | Performed by: HOSPITALIST

## 2023-06-21 RX ORDER — PSEUDOEPHEDRINE HCL 30 MG
100 TABLET ORAL 2 TIMES DAILY PRN
Qty: 30 CAPSULE | Refills: 0 | Status: SHIPPED | COMMUNITY
Start: 2023-06-21

## 2023-06-21 RX ORDER — CYCLOBENZAPRINE HCL 10 MG
10 TABLET ORAL 3 TIMES DAILY PRN
Qty: 30 TABLET | Refills: 0 | Status: SHIPPED | OUTPATIENT
Start: 2023-06-21

## 2023-06-21 RX ORDER — HYDROCODONE BITARTRATE AND ACETAMINOPHEN 10; 325 MG/1; MG/1
1-2 TABLET ORAL EVERY 6 HOURS PRN
Qty: 1 TABLET | Refills: 0 | Status: SHIPPED | COMMUNITY
Start: 2023-06-21

## 2023-06-21 NOTE — PLAN OF CARE
Patient is A&OX4, Diony  [place] collar on at all times. Denies numbness/tingling. SCDs for DVT prophylaxis, refusing TEDs at this time. PO oxy and flexeril given for pain along with IV dilaudid one time dose to help catch up. Hemovac removed per order. Patient has clearances from surgical, medical and PT/OT. Discharge went over with patient and paperwork given to patient with scripts. Bed in lowest position and call light within reach.   Problem: Patient Centered Care  Goal: Patient preferences are identified and integrated in the patient's plan of care  Description: Interventions:  - What would you like us to know as we care for you?   - Provide timely, complete, and accurate information to patient/family  - Incorporate patient and family knowledge, values, beliefs, and cultural backgrounds into the planning and delivery of care  - Encourage patient/family to participate in care and decision-making at the level they choose  - Honor patient and family perspectives and choices  Outcome: Adequate for Discharge

## 2023-06-21 NOTE — PLAN OF CARE
Post-op day 2. Alert and oriented x4 on room air. No acute changes overnight. Aspen collar on at all times. Voiding freely. PRN oxy and flexeril given for pain. Hemovac in place and draining. Call light within reach.      Problem: Patient Centered Care  Goal: Patient preferences are identified and integrated in the patient's plan of care  Description: Interventions:  - What would you like us to know as we care for you?   - Provide timely, complete, and accurate information to patient/family  - Incorporate patient and family knowledge, values, beliefs, and cultural backgrounds into the planning and delivery of care  - Encourage patient/family to participate in care and decision-making at the level they choose  - Honor patient and family perspectives and choices  Outcome: Progressing     Problem: Patient/Family Goals  Goal: Patient/Family Long Term Goal  Description: Patient's Long Term Goal:     Interventions:  -   - See additional Care Plan goals for specific interventions  Outcome: Progressing  Goal: Patient/Family Short Term Goal  Description: Patient's Short Term Goal:     Interventions:   -   - See additional Care Plan goals for specific interventions  Outcome: Progressing     Problem: PAIN - ADULT  Goal: Verbalizes/displays adequate comfort level or patient's stated pain goal  Description: INTERVENTIONS:  - Encourage pt to monitor pain and request assistance  - Assess pain using appropriate pain scale  - Administer analgesics based on type and severity of pain and evaluate response  - Implement non-pharmacological measures as appropriate and evaluate response  - Consider cultural and social influences on pain and pain management  - Manage/alleviate anxiety  - Utilize distraction and/or relaxation techniques  - Monitor for opioid side effects  - Notify MD/LIP if interventions unsuccessful or patient reports new pain  - Anticipate increased pain with activity and pre-medicate as appropriate  Outcome: Progressing

## 2023-06-21 NOTE — DISCHARGE INSTRUCTIONS
Inc mikhail for home  Wound care and activity orders as per surgery  Home hhc/pt/ot     Medication List        START taking these medications      cyclobenzaprine 10 MG Tabs  Commonly known as: Flexeril  Take 1 tablet (10 mg total) by mouth 3 (three) times daily as needed for Muscle spasms. docusate sodium 100 MG Caps  Commonly known as: COLACE     Sennosides 17.2 MG Tabs  Take 1 tablet (17.2 mg total) by mouth nightly. CONTINUE taking these medications      albuterol 108 (90 Base) MCG/ACT Aers  Commonly known as: Ventolin HFA  Inhale 2 puffs into the lungs every 6 (six) hours as needed for Wheezing. Dose unknown by patient     HYDROcodone-acetaminophen  MG Tabs  Commonly known as: Norco     levETIRAcetam 500 MG Tabs  Commonly known as: Keppra  Take 1 tablet (500 mg total) by mouth daily. levothyroxine 100 MCG Tabs  Commonly known as: Synthroid  Take 1 tablet (100 mcg total) by mouth before breakfast.     * OXcarbazepine 300 MG Tabs  Commonly known as: Trileptal  Take 1 tablet (300 mg total) by mouth nightly. TAKE AT BEDTIME     * OXcarbazepine 600 MG Tabs  Commonly known as: Trileptal  Take 1 tablet (600 mg total) by mouth daily. predniSONE 10 MG Tabs  Commonly known as: Deltasone           * This list has 2 medication(s) that are the same as other medications prescribed for you. Read the directions carefully, and ask your doctor or other care provider to review them with you.                 STOP taking these medications      methylPREDNISolone 4 MG Tbpk  Commonly known as: Medrol               Where to Get Your Medications        These medications were sent to [address], [id number] [address] [facility] [address] [place] [address]      Phone: [phone]   cyclobenzaprine 10 MG Tabs  Sennosides 17.2 MG Tabs

## 2023-06-21 NOTE — DISCHARGE SUMMARY
Dc summary#[id number]    > 30 min spent on [address] Discharge Diagnoses: cspine surgery    Lace+ Score: 17  59-90 High Risk  29-58 Medium Risk  0-28   Low Risk. TCM Follow-Up Recommendation:  LACE 29-58:  Moderate Risk of readmission after discharge from the hospital.tcm fu recommended

## 2023-06-26 NOTE — DISCHARGE SUMMARY
[facility]    PATIENT'S NAME: Emile RUSSELL   ATTENDING PHYSICIAN: Heather Borden MD   PATIENT ACCOUNT#:   [id number]    LOCATION:  [address] [place] [address] #:   [id number]       YOB: 1970  ADMISSION DATE:       06/19/2023      DISCHARGE DATE:  06/21/2023    DISCHARGE SUMMARY    About 45 minutes were spent preparing this discharge. DISCHARGE DIAGNOSIS:  Status post C3 to C7 posterior cervical decompression. HISTORY AND HOSPITAL COURSE:  This is a very pleasant 55-year-old [facility] American female who appears younger than her stated age, who presents with a history of cervical spondylosis with myelopathy. She had this procedure performed, and the patient did very well. Immediately postoperatively, she had difficulties with pain and movement, but today she improved. She said that her pain was well managed with oral medications and that she has an extensive network of help at home and no pets that can trip her, no area rugs, night lights for the bathroom, and she felt comfortable going home. So I discharged her home to follow up as an outpatient. PHYSICAL EXAMINATION:    VITAL SIGNS:  On discharge, temperature is 99.3, pulse 77, respiratory rate 20, blood pressure 135/87, 98%. LUNGS:  Occasional rhonchi. HEART:  Normal S1, S2. No S3.   ABDOMEN:  Soft. EXTREMITIES:  Without edema. NEUROLOGIC:  She can freely move her tongue, shrug her shoulders. She is able to speak. She is not drooling. She has no saddle anesthesia and understands all my discharge instructions. LABORATORY STUDIES:  Please see chart. ASSESSMENT AND PLAN:    1. Cervical spinal surgery as described above. Patient doing well. Follow up PT/OT at home. 2.   Adrenal insufficiency. Continue medication. 3.   Hypothyroidism. 4.   Panhypopituitarism. Follow up with primary. 5.   Epilepsy. Continue medications.   6.   DVT prophylaxis with SCDs, as patient just had major neck surgery. CONDITION ON DISCHARGE:  Stable. CODE STATUS:  Not discussed during this hospitalization. ACTIVITY:  PT and OT as frequently as possible. FOLLOWUP:  With Dr. Sebastian Ramirez for all wound care and activity orders. C-collar. Dr. Sebastian Ramirez to clear patient when she will be able to drive. Return if there is saddle anesthesia, fevers, chills, wound drainage, or any other such complaints. Incentive spirometry for home. DIET:  As tolerated. Return if there is trouble eating or swallowing. DISCHARGE MEDICATIONS:    1. Ventolin 2 puffs q.6 h. as needed. 2.   Norco 10/325 one to two tablets q.6 h. as needed. 3.   Keppra 500 mg daily. 4.   Synthroid 100 mcg before breakfast daily. 5.   Trileptal 300 mg at night and 600 mg in the morning. 6.   Prednisone 10 mg at bedtime. Take with food. 7.   Flexeril 10 mg 3 times a day as needed for muscle spasms. 8.   Colace 100 mg p.o. b.i.d. p.r.n. constipation. 9.   Senna 17.2 mg nightly. Stop if diarrhea. RISK OF READMISSION:  Unfortunately is moderate. TCM followup is recommended. Dictated By Amelia Catherine.  MD Suha  d: 06/21/2023 18:20:33  t: 06/21/2023 19:59:26  Job [id number]  Y/

## 2024-07-23 ENCOUNTER — OFFICE VISIT (OUTPATIENT)
Dept: PHYSICAL MEDICINE AND REHAB | Facility: CLINIC | Age: 54
End: 2024-07-23
Payer: MEDICAID

## 2024-07-23 VITALS — HEIGHT: 68 IN | BODY MASS INDEX: 27.43 KG/M2 | WEIGHT: 181 LBS

## 2024-07-23 DIAGNOSIS — G89.4 CHRONIC PAIN SYNDROME: Primary | ICD-10-CM

## 2024-07-23 DIAGNOSIS — M47.816 LUMBAR FACET JOINT SYNDROME: ICD-10-CM

## 2024-07-23 PROCEDURE — 99244 OFF/OP CNSLTJ NEW/EST MOD 40: CPT | Performed by: PHYSICAL MEDICINE & REHABILITATION

## 2024-07-23 RX ORDER — DULOXETIN HYDROCHLORIDE 30 MG/1
30 CAPSULE, DELAYED RELEASE ORAL DAILY
Qty: 30 CAPSULE | Refills: 0 | Status: SHIPPED | OUTPATIENT
Start: 2024-07-23

## 2024-07-23 RX ORDER — HYDROCODONE BITARTRATE AND ACETAMINOPHEN 5; 325 MG/1; MG/1
TABLET ORAL
COMMUNITY

## 2024-07-23 RX ORDER — LIDOCAINE HCL 4% 4 G/100G
CREAM TOPICAL
COMMUNITY
Start: 2023-12-08

## 2024-07-23 NOTE — PATIENT INSTRUCTIONS
Try Cymbalta 30 mg daily  See integrative medicine  Please bring your outside records of what procedures you have had done  We can consider facet joint injections

## 2024-07-23 NOTE — PROGRESS NOTES
[facility]  NEW PATIENT EVALUATION    Consultation as a request of       HISTORY OF PRESENT ILLNESS:     Chief Complaint   Patient presents with    Low Back Pain     New right handed patient c/o BL low back pain that has been present for 20+ years, denies injury. C/o constant aching pain, worse when walking/standing more than 15 minutes. Denies N/T. Currently in PT for the last 1.5 years with minimal relief. Had C3 - C7 posterior cervical decompression fusion 6/19/2023.    Norco very rarely, gabapentin BID,flexeril PRN, topical lidocaine/diclofenac PRN all with minimal relief. Lumbar MRI 5/16/24. LOP 7/10       The patient is a 54 year old female with significant past medical history of disease, asthma, brain tumor, cervical spinal stenosis status post fusion surgery, osteoarthritis, seizure disorder who presents with lower back pain.  Patient states she has chronic pain syndrome for her neck as well as her lower back.  Patient states the pain has been ongoing since 2008.  She has seen pain management and has received radiofrequency ablation injections.  She has seen neurosurgery.  She has had MRI imaging of the lumbar spine as noted below.  She states the pain is localized in the axial lumbar spine.  Pain is constant aggravated with sitting standing or laying down.  She denies any radiating pain in the legs, any numbness tingling or weakness.  She denies any saddle anesthesia, any loss of bowel bladder control, any fevers chills or weight loss.  She has ongoing neck pain as well.  She takes Norco very rarely but has 2discontinued it and takes oxycodone as needed for pain.  She takes Flexeril as needed as well as gabapentin twice daily..  She is applying topical lidocaine patch and on prednisone medication as well.  She rates her pain to be 7 out of 10.  She has had physical therapy for the lumbar spine as well as cervical spine without any significant  change.  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    PHYSICAL EXAM:   Ht 68\"   Wt 181 lb (82.1 kg)   BMI 27.52 kg/m²     Gait  Able to toe walk and heel walk without any difficulty    LUMBAR SPINE:  Inspection: no erythema, swelling, or obvious deformity.  Their iliac crest and shoulder heights are symmetrical.     Palpation: Non tender to palpation of the spinous process. TTP of bilateral lumbar paraspinal muscles, nontender SI joint  ROM: Severely restricted range of motion more so with extension then forward flexion  Strength: 5/5 in bilateral lower extremities  Sensation: Intact to light touch in all dermatomes of the lower extremities  Reflexes: 2/4 at L4 and S1  Facet Loading: Positive bilateral lower lumbar facet joints  Straight leg raise: negative for radicular pain symptoms  Slump test: negative for pain symptoms for radicular pain symptoms      IMAGING:     MRI lumbar spine completed outside institution on 5/16/2024 was personally reviewed which is notable for mild disc bulging at L3-4 L4-5 and L5-S1 with a small paracentral annular tear at L5-S1.  There is no significant spinal or foraminal narrowing at any level.  There is mild facet degenerative changes in the bilateral lower lumbar facet joints bilaterally.    All imaging results were reviewed and discussed with patient.      ASSESSMENT/PLAN:     1. Chronic pain syndrome    2. Lumbar facet joint syndrome        Inés De Santiago is a 54-year-old female presenting today for evaluation of chronic pain syndrome.  Recommend she seek integrative medicine consultation for consideration of acupuncture and other treatments.  Recommend she start Cymbalta 30 mg daily and bring records of what procedure she had done in outside institution as they have been ineffective and I would not like to repeat the same.  Patient recalls potentially having facet joint injections  done.  We could consider this though we discussed that I do not think it would help with all of her pain symptoms.  Her pain is out of proportion to her physical exam and her imaging findings.  Recommend follow-up in 4 weeks.      The patient verbalized understanding with the plan and was in agreement. All questions/concerns were addressed and there were no barriers to learning.  Please note Dragon dictation software was used to dictate this note and may result in inadvertent typos.    Herman Mcbride DO, FAAPMR & CAQSM  Physical Medicine and Rehabilitation  Sports and Spine Medicine    PAST MEDICAL HISTORY:     Past Medical History:    Adrenal insufficiency ([place]'s disease) (HCC)    Asthma (HCC)    Back problem    Brain tumor (HCC)    Bronchitis    History of blood transfusion    [facility]    History of diabetes insipidus    Hypothyroid    Osteoarthritis    Pneumonia due to organism    Seizure disorder (HCC)    last seizure 6/8/2023    Vertigo         PAST SURGICAL HISTORY:     Past Surgical History:   Procedure Laterality Date    Brain surgery      1998 pituitary tumor - partial    Excis pituitary,intracranial  1998    Tonsillectomy      1/23/2017         CURRENT MEDICATIONS:     Current Outpatient Medications   Medication Sig Dispense Refill    diclofenac 1 % External Gel APPLY 2 GRAMS TO THE AFFECTED AREA(S) BY TOPICAL ROUTE 4 TIMES PER DAY      Lidocaine HCl 4 % External Cream Aspercreme (lidocaine HCl) 4 % topical   Apply thin layer to affected area twice a day      HYDROcodone-acetaminophen 5-325 MG Oral Tab TAKE ONE TABLET BY MOUTH EVERY SIX TO EIGHT HOURS AS NEEDED FOR PAIN. DO NOT COMBINE WITH ANY OTHER NARCOTIC PAIN MEDICATION. DO NOT DRIVE, CONSUME ALCOHOL, OR OPERATE MACHINERY WHILE TAKING THIS MEDICATION      cyclobenzaprine 10 MG Oral Tab Take 1 tablet (10 mg total) by mouth 3 (three) times daily as needed for Muscle spasms. 30 tablet 0    docusate sodium 100 MG Oral Cap Take 100 mg by mouth 2 (two) times  daily as needed for constipation. 30 capsule 0    sennosides 17.2 MG Oral Tab Take 1 tablet (17.2 mg total) by mouth nightly. 30 tablet 0    HYDROcodone-acetaminophen  MG Oral Tab Take 1-2 tablets by mouth every 6 (six) hours as needed for Pain. 1 tablet 0    predniSONE 10 MG Oral Tab Take 1 tablet (10 mg total) by mouth at bedtime.      OXcarbazepine 600 MG Oral Tab Take 1 tablet (600 mg total) by mouth daily. 90 tablet 1    levothyroxine 100 MCG Oral Tab Take 1 tablet (100 mcg total) by mouth before breakfast. 90 tablet 1    OXCARBAZEPINE 300 MG Oral Tab Take 1 tablet (300 mg total) by mouth nightly. TAKE AT BEDTIME 90 tablet 0    levETIRAcetam (KEPPRA) 500 MG Oral Tab Take 1 tablet (500 mg total) by mouth daily. 90 tablet 1    Albuterol Sulfate  (90 Base) MCG/ACT Inhalation Aero Soln Inhale 2 puffs into the lungs every 6 (six) hours as needed for Wheezing. Dose unknown by patient 1 Inhaler 3         ALLERGIES:     Allergies   Allergen Reactions    Ciprofloxacin NAUSEA AND VOMITING    Cyprodenate NAUSEA ONLY         FAMILY HISTORY:     Family History   Problem Relation Age of Onset    Heart Disorder Father         heart disease    Hypertension Father     Other (Other) Father 78        kidney failure    Heart Disorder Other           SOCIAL HISTORY:     Social History     Socioeconomic History    Marital status: Single   Tobacco Use    Smoking status: Former     Current packs/day: 0.00     Types: Cigarettes     Quit date: 2023     Years since quittin.1    Smokeless tobacco: Never    Tobacco comments:     social smoker   Vaping Use    Vaping status: Never Used   Substance and Sexual Activity    Alcohol use: Not Currently    Drug use: Never          REVIEW OF SYSTEMS:   Patient-reported ROS  Constitutional  Sleep Disturbance: admits  Chills: denies  Fever: denies  Weight Gain: denies  Weight Loss: denies   Cardiovascular  Chest Pain: admits  Irregular Heartbeat: denies   Respiratory  Painful  Breathing: admits  Wheezing: admits   Gastrointestinal  Bowel Incontinence: denies  Heartburn: denies  Abdominal Pain: denies  Blood in Stool : denies  Rectal Pain: denies   Hematology  Easy Bruising: admits  Easy Bleeding: denies   Genitourinary  Difficulty Urinating: denies  Bladder Incontinence: denies  Pelvic Pain: denies  Painful Urination: denies   Musculoskeletal  Joint Stiffness: admits  Painful Joints: admits  Tailbone Pain: denies  Swollen Joints: denies   Peripheral Vascular  Swelling of Legs/Feet: admits  Cold Extremities: admits   Skin  Open Sores: denies  Nodules or Lumps: denies  Rash: denies   Neurological  Loss of Strength Since last Visit: admits  Tingling/Numbness: denies  Balance: admits   Psychiatric  Anxiety: admits  Depressed Mood: admits       PHYSICAL EXAM:   General: No immediate distress  Head: Normocephalic/ Atraumatic  Eyes: Extra-occular movements intact.   Ears: No auricular hematoma or deformities  Mouth: No lesions or ulcerations  Heart: peripheral pulses intact. Normal capillary refill.   Lungs: Non-labored respirations  Abdomen: No abdominal guarding  Extremities: No lower extremity edema bilaterally   Skin: No lesions noted   Cognition: alert & oriented x 3, attentive, able to follow 2 step commands, comprehention intact, spontaneous speech intact  Psychiatric: Mood and affect appropriate      LABS:   No results found for: \"EAG\", \"A1C\"  Lab Results   Component Value Date    WBC 7.8 06/21/2023    WBC 7.8 06/21/2023    RBC 3.64 (L) 06/21/2023    RBC 3.64 (L) 06/21/2023    HGB 10.7 (L) 06/21/2023    HGB 10.7 (L) 06/21/2023    HCT 33.0 (L) 06/21/2023    HCT 33.0 (L) 06/21/2023    MCV 90.7 06/21/2023    MCV 90.7 06/21/2023    MCH 29.4 06/21/2023    MCH 29.4 06/21/2023    MCHC 32.4 06/21/2023    MCHC 32.4 06/21/2023    RDW 14.5 06/21/2023    RDW 14.5 06/21/2023    .0 06/21/2023    .0 06/21/2023    MPV 7.8 02/02/2017     Lab Results   Component Value Date     (H)  06/21/2023    BUN 15 06/21/2023    BUNCREA 19.2 06/21/2023    CREATSERUM 0.78 06/21/2023    ANIONGAP 6 06/21/2023    GFRNAA 59 (L) 01/21/2021    GFRAA 68 01/21/2021    CA 8.1 (L) 06/21/2023    OSMOCALC 291 06/21/2023    ALKPHO 99 06/15/2023    AST 20 06/15/2023    ALT 43 06/15/2023    BILT 0.2 06/15/2023    TP 7.3 06/15/2023    ALB 3.6 06/15/2023    GLOBULIN 3.7 06/15/2023     06/21/2023    K 4.2 06/21/2023     06/21/2023    CO2 31.0 06/21/2023     No results found for: \"PTP\", \"PT\", \"INR\"  No results found for: \"VITD\", \"QVITD\", \"[id number]\"

## 2024-10-18 ENCOUNTER — HOSPITAL ENCOUNTER (EMERGENCY)
Facility: HOSPITAL | Age: 54
Discharge: HOME | End: 2024-10-19
Attending: EMERGENCY MEDICINE
Payer: MEDICAID

## 2024-10-18 VITALS
HEART RATE: 97 BPM | SYSTOLIC BLOOD PRESSURE: 114 MMHG | OXYGEN SATURATION: 99 % | HEIGHT: 68 IN | TEMPERATURE: 98 F | RESPIRATION RATE: 20 BRPM | BODY MASS INDEX: 24.4 KG/M2 | WEIGHT: 161 LBS | DIASTOLIC BLOOD PRESSURE: 76 MMHG

## 2024-10-18 DIAGNOSIS — G89.29 CHRONIC BILATERAL LOW BACK PAIN WITH BILATERAL SCIATICA: Primary | ICD-10-CM

## 2024-10-18 DIAGNOSIS — M54.42 CHRONIC BILATERAL LOW BACK PAIN WITH BILATERAL SCIATICA: Primary | ICD-10-CM

## 2024-10-18 DIAGNOSIS — M54.41 CHRONIC BILATERAL LOW BACK PAIN WITH BILATERAL SCIATICA: Primary | ICD-10-CM

## 2024-10-18 DIAGNOSIS — E83.42 HYPOMAGNESEMIA: ICD-10-CM

## 2024-10-18 DIAGNOSIS — E87.6 HYPOKALEMIA: ICD-10-CM

## 2024-10-18 LAB
ANION GAP SERPL CALC-SCNC: 7 MMOL/L (ref 0–18)
BASOPHILS # BLD AUTO: 0.01 X10(3) UL (ref 0–0.2)
BASOPHILS NFR BLD AUTO: 0.2 %
BUN BLD-MCNC: <5 MG/DL (ref 9–23)
CALCIUM BLD-MCNC: 9.3 MG/DL (ref 8.7–10.4)
CHLORIDE SERPL-SCNC: 105 MMOL/L (ref 98–112)
CO2 SERPL-SCNC: 29 MMOL/L (ref 21–32)
CREAT BLD-MCNC: 0.68 MG/DL
DEPRECATED RDW RBC AUTO: 40.3 FL (ref 35.1–46.3)
EGFRCR SERPLBLD CKD-EPI 2021: 104 ML/MIN/1.73M2 (ref 60–?)
EOSINOPHIL # BLD AUTO: 0.03 X10(3) UL (ref 0–0.7)
EOSINOPHIL NFR BLD AUTO: 0.7 %
ERYTHROCYTE [DISTWIDTH] IN BLOOD BY AUTOMATED COUNT: 13.1 % (ref 11–15)
GLUCOSE BLD-MCNC: 99 MG/DL (ref 70–99)
HCT VFR BLD AUTO: 32.8 %
HGB BLD-MCNC: 11.2 G/DL
IMM GRANULOCYTES # BLD AUTO: 0.01 X10(3) UL (ref 0–1)
IMM GRANULOCYTES NFR BLD: 0.2 %
LYMPHOCYTES # BLD AUTO: 1.28 X10(3) UL (ref 1–4)
LYMPHOCYTES NFR BLD AUTO: 29.9 %
MAGNESIUM SERPL-MCNC: 1.5 MG/DL (ref 1.6–2.6)
MCH RBC QN AUTO: 29 PG (ref 26–34)
MCHC RBC AUTO-ENTMCNC: 34.1 G/DL (ref 31–37)
MCV RBC AUTO: 85 FL
MONOCYTES # BLD AUTO: 0.15 X10(3) UL (ref 0.1–1)
MONOCYTES NFR BLD AUTO: 3.5 %
NEUTROPHILS # BLD AUTO: 2.8 X10 (3) UL (ref 1.5–7.7)
NEUTROPHILS # BLD AUTO: 2.8 X10(3) UL (ref 1.5–7.7)
NEUTROPHILS NFR BLD AUTO: 65.5 %
PLATELET # BLD AUTO: 223 10(3)UL (ref 150–450)
POTASSIUM SERPL-SCNC: 3.2 MMOL/L (ref 3.5–5.1)
RBC # BLD AUTO: 3.86 X10(6)UL
SODIUM SERPL-SCNC: 141 MMOL/L (ref 136–145)
WBC # BLD AUTO: 4.3 X10(3) UL (ref 4–11)

## 2024-10-18 PROCEDURE — 83735 ASSAY OF MAGNESIUM: CPT | Performed by: EMERGENCY MEDICINE

## 2024-10-18 PROCEDURE — 96374 THER/PROPH/DIAG INJ IV PUSH: CPT

## 2024-10-18 PROCEDURE — 96375 TX/PRO/DX INJ NEW DRUG ADDON: CPT

## 2024-10-18 PROCEDURE — 99285 EMERGENCY DEPT VISIT HI MDM: CPT

## 2024-10-18 PROCEDURE — 80048 BASIC METABOLIC PNL TOTAL CA: CPT | Performed by: EMERGENCY MEDICINE

## 2024-10-18 PROCEDURE — 85025 COMPLETE CBC W/AUTO DIFF WBC: CPT | Performed by: EMERGENCY MEDICINE

## 2024-10-18 PROCEDURE — 99284 EMERGENCY DEPT VISIT MOD MDM: CPT

## 2024-10-18 RX ORDER — POTASSIUM CHLORIDE 1500 MG/1
20 TABLET, EXTENDED RELEASE ORAL DAILY
Qty: 5 TABLET | Refills: 0 | Status: SHIPPED | OUTPATIENT
Start: 2024-10-18 | End: 2024-10-23

## 2024-10-18 RX ORDER — DIAZEPAM 10 MG/2ML
5 INJECTION, SOLUTION INTRAMUSCULAR; INTRAVENOUS ONCE
Status: COMPLETED | OUTPATIENT
Start: 2024-10-18 | End: 2024-10-18

## 2024-10-18 RX ORDER — KETOROLAC TROMETHAMINE 15 MG/ML
15 INJECTION, SOLUTION INTRAMUSCULAR; INTRAVENOUS ONCE
Status: COMPLETED | OUTPATIENT
Start: 2024-10-18 | End: 2024-10-18

## 2024-10-18 RX ORDER — METHYLPREDNISOLONE 4 MG/1
TABLET ORAL
Qty: 1 EACH | Refills: 0 | Status: SHIPPED | OUTPATIENT
Start: 2024-10-18

## 2024-10-18 RX ORDER — MAGNESIUM OXIDE 400 MG/1
800 TABLET ORAL ONCE
Status: COMPLETED | OUTPATIENT
Start: 2024-10-18 | End: 2024-10-18

## 2024-10-18 RX ORDER — MAGNESIUM OXIDE 400 MG/1
400 TABLET ORAL DAILY
Qty: 5 TABLET | Refills: 0 | Status: SHIPPED | OUTPATIENT
Start: 2024-10-18 | End: 2024-10-23

## 2024-10-18 RX ORDER — POTASSIUM CHLORIDE 1500 MG/1
40 TABLET, EXTENDED RELEASE ORAL ONCE
Status: COMPLETED | OUTPATIENT
Start: 2024-10-18 | End: 2024-10-18

## 2024-10-18 RX ORDER — METHYLPREDNISOLONE SODIUM SUCCINATE 125 MG/2ML
60 INJECTION, POWDER, LYOPHILIZED, FOR SOLUTION INTRAMUSCULAR; INTRAVENOUS ONCE
Status: COMPLETED | OUTPATIENT
Start: 2024-10-18 | End: 2024-10-18

## 2024-10-18 RX ORDER — HYDROCODONE BITARTRATE AND ACETAMINOPHEN 5; 325 MG/1; MG/1
1 TABLET ORAL EVERY 6 HOURS PRN
Qty: 10 TABLET | Refills: 0 | Status: SHIPPED | OUTPATIENT
Start: 2024-10-18

## 2024-10-18 RX ORDER — DROPERIDOL 2.5 MG/ML
5 INJECTION, SOLUTION INTRAMUSCULAR; INTRAVENOUS ONCE
Status: COMPLETED | OUTPATIENT
Start: 2024-10-18 | End: 2024-10-18

## 2024-10-18 NOTE — ED INITIAL ASSESSMENT (HPI)
C/o lower back pain radiating down both legs since August. Pt reports hx arthritis in lower back, spinal  surgery performed in June. Denies incontinence, numbness/tingling

## 2024-10-19 NOTE — ED PROVIDER NOTES
Patient Seen in: [facility] Emergency Department      History     Chief Complaint   Patient presents with    Back Pain     Stated Complaint: Lower Back Pain    Subjective:   HPI      44-year-old female with listed history who had cervical spine surgery somewhat extensive back in 2023 who states she has been out of work and pretty much incapacitated since then.  She has seen her surgeon and had recurrent MRIs including MRIs of her low back.  She now complains mostly of low back pain.  She reports pain in both legs.  No bowel or bladder dysfunction.  No fall or trauma.  No fever.  Currently she states she takes no medications at home.  She last saw physiatry about 2 and half months ago.  She had an MRI around that time as well.    Objective:     Past Medical History:    Adrenal insufficiency (Wilcox's disease) (HCC)    Asthma (HCC)    Back problem    Brain tumor (HCC)    Bronchitis    History of blood transfusion    UIC    History of diabetes insipidus    Hypothyroid    Osteoarthritis    Pneumonia due to organism    Seizure disorder (HCC)    last seizure 2023    Vertigo              Past Surgical History:   Procedure Laterality Date    Brain surgery       pituitary tumor - partial    Excis pituitary,intracranial      Tonsillectomy      2017                Social History     Socioeconomic History    Marital status: Single   Tobacco Use    Smoking status: Former     Current packs/day: 0.00     Types: Cigarettes     Quit date: 2023     Years since quittin.3    Smokeless tobacco: Never    Tobacco comments:     social smoker   Vaping Use    Vaping status: Never Used   Substance and Sexual Activity    Alcohol use: Not Currently    Drug use: Never                  Physical Exam     ED Triage Vitals [10/18/24 1756]   /85   Pulse 85   Resp 16   Temp 97.8 °F (36.6 °C)   Temp src    SpO2 98 %   O2 Device None (Room air)       Current Vitals:   No data recorded      Physical  Exam  Constitutional: Oriented to person, place, and time.  Appears well-developed. No distress.   Head: Normocephalic and atraumatic.   Eyes: Conjunctivae are normal. Pupils are equal, round, and reactive to light.   Neck: Normal range of motion. Neck supple.  Postsurgical changes noted.  No signs of infection  Cardiovascular: Normal rate, regular rhythm and intact distal pulses.    Pulmonary/Chest: Effort normal. No respiratory distress.   Abdominal: Soft. There is no tenderness. There is no guarding.   Musculoskeletal: Normal range of motion.  Pain diffusely throughout the midline lumbar spine as well as the paraspinal regions bilaterally.  No flank pain  Neurological: Alert and oriented to person, place, and time.  No gross motor or sensory deficit lower extremities.  Skin: Skin is warm and dry.   Psychiatric: Normal mood and affect.  Behavior is normal.   Nursing note and vitals reviewed.    Differential diagnosis includes chronic low back pain syndrome, muscle spasm.      ED Course     Labs Reviewed   BASIC METABOLIC PANEL (8) - Abnormal; Notable for the following components:       Result Value    Potassium 3.2 (*)     BUN <5 (*)     All other components within normal limits   CBC WITH DIFFERENTIAL WITH PLATELET - Abnormal; Notable for the following components:    HGB 11.2 (*)     HCT 32.8 (*)     All other components within normal limits   MAGNESIUM - Abnormal; Notable for the following components:    Magnesium 1.5 (*)     All other components within normal limits                   MDM              Medical Decision Making  Patient is stable here.  MRI results and notes reviewed from physiatry visit at the end of July.  Will recommend follow-up ongoing ice and heat physical therapy and instructions to return with any worsening or change.  No indication for admission at this time.    Problems Addressed:  Chronic bilateral low back pain with bilateral sciatica: chronic illness or injury with exacerbation,  progression, or side effects of treatment  Hypokalemia: chronic illness or injury with exacerbation, progression, or side effects of treatment  Hypomagnesemia: acute illness or injury    Amount and/or Complexity of Data Reviewed  External Data Reviewed: radiology and notes.     Details: 7/26/24 MRI lumbar spine w/o significant abnormalities      7/23/24 PM&R note by Dr. JAKE Mcbride:  1. Chronic pain syndrome   2. Lumbar facet joint syndrome         Inés De Santiago is a 54-year-old female presenting today for evaluation of chronic pain syndrome.  Recommend she seek integrative medicine consultation for consideration of acupuncture and other treatments.  Recommend she start Cymbalta 30 mg daily and bring records of what procedure she had done in outside institution as they have been ineffective and I would not like to repeat the same.  Patient recalls potentially having facet joint injections done.  We could consider this though we discussed that I do not think it would help with all of her pain symptoms.  Her pain is out of proportion to her physical exam and her imaging findings.  Recommend follow-up in 4 weeks.       Labs: ordered. Decision-making details documented in ED Course.    Risk  OTC drugs.  Prescription drug management.  Parenteral controlled substances.  Decision regarding hospitalization.        Disposition and Plan     Clinical Impression:  1. Chronic bilateral low back pain with bilateral sciatica    2. Hypokalemia    3. Hypomagnesemia         Disposition:  Discharge  10/18/2024 11:48 pm    Follow-up:  Herman Mcbride Y, DO  [address] [facility] [address] [place] [id number]  [phone]    Follow up            Medications Prescribed:  Discharge Medication List as of 10/18/2024 11:51 PM        START taking these medications    Details   potassium chloride 20 MEQ Oral Tab CR Take 1 tablet (20 mEq total) by mouth daily for 5 days., Normal, Disp-5 tablet, R-0      magnesium oxide 400 MG Oral Tab Take 1 tablet  (400 mg total) by mouth daily for 5 days., Normal, Disp-5 tablet, R-0      methylPREDNISolone 4 MG Oral Tablet Therapy Pack Dosepack: use as directed on packaging, Normal, Disp-1 each, R-0      !! HYDROcodone-acetaminophen 5-325 MG Oral Tab Take 1 tablet by mouth every 6 (six) hours as needed., Normal, Disp-10 tablet, R-0       !! - Potential duplicate medications found. Please discuss with provider.              Supplementary Documentation:

## (undated) DEVICE — VIOLET BRAIDED (POLYGLACTIN 910), SYNTHETIC ABSORBABLE SUTURE: Brand: COATED VICRYL

## (undated) DEVICE — DRESSING BIOPATCH 1X4 BLUE

## (undated) DEVICE — GAUZE TRAY STERILE 4X4 12PLY

## (undated) DEVICE — SOL NACL IRRIG 0.9% 1000ML BTL

## (undated) DEVICE — 3.0MM NEURO (MATCH HEAD) SOFT TOUCH

## (undated) DEVICE — DERMABOND CLOSURE 0.7ML TOPICL

## (undated) DEVICE — Device

## (undated) DEVICE — E-Z BUTTON SWITCH PENCIL

## (undated) DEVICE — KIT DRN 1/8IN PVC 3 SPRG EVAC

## (undated) DEVICE — FLOSEAL HEMOSTATIC MATRIX, 5ML: Brand: FLOSEAL HEMOSTATIC MATRIX

## (undated) DEVICE — SUT VICRYL 0 CT-2 J727D

## (undated) DEVICE — 3M™ TEGADERM™ TRANSPARENT FILM DRESSING, 1626W, 4 IN X 4-3/4 IN (10 CM X 12 CM), 50 EACH/CARTON, 4 CARTON/CASE: Brand: 3M™ TEGADERM™

## (undated) DEVICE — 2.0MM TAPERED ROUND SOFT-TOUCH

## (undated) DEVICE — GAMMEX® PI HYBRID SIZE 7.5, STERILE POWDER-FREE SURGICAL GLOVE, POLYISOPRENE AND NEOPRENE BLEND: Brand: GAMMEX

## (undated) DEVICE — LAMINECTOMY: Brand: MEDLINE INDUSTRIES, INC.

## (undated) DEVICE — GAMMEX® NON-LATEX PI ORTHO SIZE 7.5, STERILE POLYISOPRENE POWDER-FREE SURGICAL GLOVE: Brand: GAMMEX

## (undated) DEVICE — CLIPPER BLADE WIDE 3M

## (undated) DEVICE — TRAY SURESTEP 16 BARDEX DRAIN

## (undated) DEVICE — ELECTRODE MEGADYNE BLADE 2.5IN

## (undated) DEVICE — SUT PDS PLUS 0 CT-2 SXPP1A407

## (undated) DEVICE — FLOSEAL HEMOSTATIC MATRIX, 10ML: Brand: FLOSEAL HEMOSTATIC MATRIX

## (undated) DEVICE — THE MILL DISPOSABLE - FINE

## (undated) DEVICE — DRILL BIT G3606010 2.4MM

## (undated) DEVICE — SUT DEV STRATA 3 PS SXPP1B107

## (undated) DEVICE — SUT VICRYL 2-0 CT-2 J726D

## (undated) DEVICE — MAYFIELD® DISPOSABLE ADULT SKULL PIN (STAINLESS STEEL): Brand: MAYFIELD®

## (undated) DEVICE — FORCEP CUSH BAY BP DISP 7.5IN

## (undated) DEVICE — SUT MONOCRYL 3-0 PS-1 Y936H

## (undated) DEVICE — C-ARMOR C-ARM EQUIPMENT COVERS CLEAR STERILE UNIVERSAL FIT 12 PER CASE: Brand: C-ARMOR

## (undated) DEVICE — PROXIMATE SKIN STAPLERS (35 WIDE) CONTAINS 35 STAINLESS STEEL STAPLES (FIXED HEAD): Brand: PROXIMATE

## (undated) NOTE — Clinical Note
March 8, 2017       [place], [place]  [address], [address] [facility]      Patient: Inés Mcfarland   YOB: 1970   Date of Visit: 2/20/2017       Dear Ms. Yeboah:      I saw April today for a followup for her kidneys.  She

## (undated) NOTE — ED AVS SNAPSHOT
Inés Carnes   MRN: [id number]    Department:  [facility] Emergency Department   Date of Visit:  3/5/2018           Disclosure     Insurance plans vary and the physician(s) referred by the ER may not be covered by your plan.  Please contact your CARE PHYSICIAN AT ONCE OR RETURN IMMEDIATELY TO THE EMERGENCY DEPARTMENT. If you have been prescribed any medication(s), please fill your prescription right away and begin taking the medication(s) as directed.   If you believe that any of the medications

## (undated) NOTE — MR AVS SNAPSHOT
[facility]  [address] [place] [address]  [phone]               Thank you for choosing us for your health care visit with Edmond Kebede. Tomás Hernandez MD.  We are glad to serve you and happy to provide you with this summary of your visit. Take 1 tablet (10 mg total) by mouth nightly. Commonly known as:  CORTEF           * hydrocortisone 20 MG Tabs   Take 1 tablet (20 mg total) by mouth daily.    Commonly known as:  CORTEF           KEPPRA 500 MG Tabs   Generic drug:  levETIRAcetam   Take 5 Your unique [employer] Access Code: [email or web address]  Expires: 4/3/2017  3:26 PM    If you have questions, you can call [phone] to talk to our [facility] Staff. Remember, [employer] is NOT to be used for urgent needs. For medical emergencies, dial 911.

## (undated) NOTE — IP AVS SNAPSHOT
[address]  [address] [facility] [phone]                Discharge Summary   2/2/2017 April [address] [facility]           Admission Information        Provider Department    2/2/2017 Orlando Jackson MD, Ryan Recio MD [facility] 5w KEPPRA 500 MG Tabs   Last time this was given:  500 mg on 2/4/2017  9:34 AM   Generic drug:  levETIRAcetam        Take 500 mg by mouth 2 (two) times daily.                                Levothyroxine Sodium 75 MCG Tabs   Last time this was given:  2/4/201 Metabolic Lab Results  (Last result in the past 90 days)    HgbA1C Glucose BUN Creatinine Calcium Alkaline Phosph AST    -- (02/04/17)  90 (02/04/17)  4 (L) (02/04/17)  0.68 (02/04/17)  8.6 (02/02/17)  85 (02/02/17)  20      Metabolic Lab Results  (Last re not sign up before the expiration date, you must request a new code. Your unique [employer] Access Code: [email or web address]  Expires: 4/3/2017  3:26 PM    If you have questions, you can call [phone] to talk to our [facility] Staff.  Remember, Jagruti alanis Most common side effects:  Increased blood sugar, high blood pressure, fluid retention, mood changes, stomach upset, headache, dizziness   What to report to your healthcare provider: Increase in blood sugar, high blood pressure, fluid retention, mood change

## (undated) NOTE — LETTER
11/1/2021 April [address]         Dear April,    This letter is to inform you that our office has made several attempts to reach you by phone without success.   We were attempting to contact you by phone
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
## (undated) NOTE — LETTER
09/09/21 April [facility]      Dear April,    Our records indicate that you have outstanding lab work and or testing that was ordered for you and has not yet been completed:  Orders Placed This Encounter      Hpv Dna  Hig